# Patient Record
Sex: FEMALE | Race: WHITE | Employment: UNEMPLOYED | URBAN - METROPOLITAN AREA
[De-identification: names, ages, dates, MRNs, and addresses within clinical notes are randomized per-mention and may not be internally consistent; named-entity substitution may affect disease eponyms.]

---

## 2018-11-19 PROBLEM — IMO0002 CHRONIC MIGRAINE: Status: ACTIVE | Noted: 2018-11-19

## 2018-11-19 PROBLEM — F90.0 ATTENTION DEFICIT HYPERACTIVITY DISORDER (ADHD), PREDOMINANTLY INATTENTIVE TYPE: Status: ACTIVE | Noted: 2018-11-19

## 2019-01-03 PROCEDURE — 88175 CYTOPATH C/V AUTO FLUID REDO: CPT | Performed by: OBSTETRICS & GYNECOLOGY

## 2019-01-03 PROCEDURE — 87624 HPV HI-RISK TYP POOLED RSLT: CPT | Performed by: OBSTETRICS & GYNECOLOGY

## 2019-09-12 PROBLEM — M54.2 NECK PAIN: Status: ACTIVE | Noted: 2019-09-12

## 2019-09-12 PROBLEM — M54.9 UPPER BACK PAIN: Status: ACTIVE | Noted: 2019-09-12

## 2020-05-07 PROBLEM — E55.9 VITAMIN D DEFICIENCY: Status: ACTIVE | Noted: 2020-05-07

## 2020-05-07 PROBLEM — K21.9 GASTROESOPHAGEAL REFLUX DISEASE WITHOUT ESOPHAGITIS: Status: ACTIVE | Noted: 2020-05-07

## 2020-12-17 NOTE — ED INITIAL ASSESSMENT (HPI)
44 y/o female to ED with c/o of adverse reaction to taking clindamycin. Patient reports she has been having panic attacks and severe nausea and hot flashes shortly after she takes medication.

## 2020-12-17 NOTE — ED PROVIDER NOTES
Patient Seen in: THE [facility] Emergency Department In [place]      History   Patient presents with:   Anxiety/Panic attack  Nausea/Vomiting/Diarrhea    Stated Complaint: clindamycin abx, having bad reaction to them, nausea and panic attack    HPI    The taryn above.    Physical Exam     ED Triage Vitals [12/17/20 0133]   BP (!) 151/93   Pulse 107   Resp 24   Temp    Temp src    SpO2 100 %   O2 Device None (Room air)       Current:/87   Pulse 100   Resp 18   Ht 165.1 cm (5' 5\")   Wt 59 kg   LMP 11/27/2020

## 2020-12-18 ENCOUNTER — HOSPITAL ENCOUNTER (EMERGENCY)
Age: 40
Discharge: HOME | End: 2020-12-19
Attending: EMERGENCY MEDICINE
Payer: COMMERCIAL

## 2020-12-18 DIAGNOSIS — K59.00 CONSTIPATION, UNSPECIFIED CONSTIPATION TYPE: Primary | ICD-10-CM

## 2020-12-18 PROCEDURE — 99284 EMERGENCY DEPT VISIT MOD MDM: CPT

## 2020-12-18 PROCEDURE — 96374 THER/PROPH/DIAG INJ IV PUSH: CPT

## 2020-12-18 PROCEDURE — 96376 TX/PRO/DX INJ SAME DRUG ADON: CPT

## 2020-12-19 ENCOUNTER — APPOINTMENT (OUTPATIENT)
Dept: GENERAL RADIOLOGY | Age: 40
End: 2020-12-19
Attending: EMERGENCY MEDICINE
Payer: COMMERCIAL

## 2020-12-19 VITALS
HEART RATE: 108 BPM | RESPIRATION RATE: 18 BRPM | SYSTOLIC BLOOD PRESSURE: 126 MMHG | OXYGEN SATURATION: 98 % | TEMPERATURE: 99 F | DIASTOLIC BLOOD PRESSURE: 87 MMHG

## 2020-12-19 PROCEDURE — 74019 RADEX ABDOMEN 2 VIEWS: CPT | Performed by: EMERGENCY MEDICINE

## 2020-12-19 RX ORDER — LORAZEPAM 2 MG/ML
0.5 INJECTION INTRAMUSCULAR ONCE
Status: COMPLETED | OUTPATIENT
Start: 2020-12-19 | End: 2020-12-19

## 2020-12-19 NOTE — ED INITIAL ASSESSMENT (HPI)
44 y/o female to ED with c/o of abdominal pain. Patient had abdomiplasty done last Friday. Patient reports she has been having abdominal pain the last few days shortly after she eats and when she is trying to have a bowel movement.  Last bowel movement was

## 2020-12-19 NOTE — ED PROVIDER NOTES
Patient Seen in: THE [facility] Emergency Department In [place]      History   Patient presents with:  Abdomen/Flank Pain  Anxiety/Panic attack    Stated Complaint: seen her wednesday, anxiety, stomach problems, constipation    HPI    Patient with abdominal p adenopathy  Lungs: Clear  Abdomen: Soft and nontender. No evidence of surgical abdomen. Not distended. Surgical wound healing well. Rectal exam: No external abnormalities. Scant stool in rectum.     ED Course     Labs Reviewed   RAINBOW DRAW BLUE   KELLY

## 2021-03-05 NOTE — ED INITIAL ASSESSMENT (HPI)
PANIC ATTACK ONSET 6 PM TONIGHT. NO RELIEF WITH 2 MG ATIVAN. PT DENIES ANY SUICIDAL OR HOMICIDAL IDEATIONS.

## 2021-03-05 NOTE — ED PROVIDER NOTES
Patient Seen in: THE [facility] Emergency Department In [place]      History   Patient presents with:   Anxiety/Panic attack    Stated Complaint: panic attack, nausea    HPI/Subjective:   HPI  64-year-old woman history of anxiety, here with complaint of severe Cardiovascular:  Normal rate and regular rhythm. No Edema  Pulmonary:  Pulmonary effort is normal.  Normal breath sounds. No wheezing, rhonchi or rales.    Abdominal: Soft nontender nondistended, normal bowel sounds, no guarding no rebound tenderness  Sk (primary encounter diagnosis)  Dehydration  Hypokalemia    Disposition:  There is no disposition on file for this visit. There is no disposition time on file for this visit.     Follow-up:  Duard Snellen, MD  [address]

## 2021-03-13 NOTE — ED PROVIDER NOTES
Patient Seen in: THE [facility] Emergency Department In [place]      History   Patient presents with:  Dizziness  Anxiety/Panic attack    Stated Complaint: dizzy, vertigo, anxious for last three hours    HPI/Subjective:   HPI    Patient with history of anxiet (5' 5\")   Wt 59 kg   LMP 02/26/2021   SpO2 99%   BMI 21.63 kg/m²         Physical Exam  Appears in good general health although very anxious. Alert and cooperative. Skin: Warm and dry without cyanosis or pallor.   HEENT: Pupils equal round reactive to li

## 2021-03-13 NOTE — ED NOTES
PT reports that her dizziness and anxiety have decreased in intensity, but are still present. PT no longer tearful.

## 2021-03-13 NOTE — ED INITIAL ASSESSMENT (HPI)
PT to the ED for evaluation of dizziness, nausea and anxiety. PT reports that her anxiety medication was recently changed from ativan to xanax. Xanax taken last at 1400. No relief. Denies chest pain. Denies shortness of breath.

## 2021-03-20 NOTE — ED INITIAL ASSESSMENT (HPI)
States she os having a panic attack for 2 hrs.  Took one Xanax 1 mg 1 hr ago and is afraid to take her Ativan

## 2021-03-21 NOTE — ED PROVIDER NOTES
Patient Seen in: [facility] Emergency Department In [place]      History   Patient presents with:   Anxiety/Panic attack    Stated Complaint: panic attack    HPI/Subjective:   HPI    67-year-old female presents to the emergency department for an anxiety at female who is hyperventilating sitting upright on a gurney. Vital signs were reviewed per nurse's notes. HEENT: Normocephalic atraumatic. Anicteric sclera. Oral mucosa is moist.  Oropharynx is normal.  Neck: No adenopathy or thyromegaly.   Lungs are dwain

## 2021-04-19 ENCOUNTER — HOSPITAL ENCOUNTER (EMERGENCY)
Age: 41
Discharge: HOME | End: 2021-04-19
Attending: EMERGENCY MEDICINE
Payer: COMMERCIAL

## 2021-04-19 VITALS
WEIGHT: 124 LBS | OXYGEN SATURATION: 98 % | HEART RATE: 74 BPM | TEMPERATURE: 98 F | BODY MASS INDEX: 20.66 KG/M2 | DIASTOLIC BLOOD PRESSURE: 56 MMHG | SYSTOLIC BLOOD PRESSURE: 124 MMHG | RESPIRATION RATE: 16 BRPM | HEIGHT: 65 IN

## 2021-04-19 DIAGNOSIS — E86.0 DEHYDRATION: Primary | ICD-10-CM

## 2021-04-19 PROCEDURE — 96361 HYDRATE IV INFUSION ADD-ON: CPT

## 2021-04-19 PROCEDURE — 99284 EMERGENCY DEPT VISIT MOD MDM: CPT

## 2021-04-19 PROCEDURE — 96374 THER/PROPH/DIAG INJ IV PUSH: CPT

## 2021-04-19 PROCEDURE — 80053 COMPREHEN METABOLIC PANEL: CPT | Performed by: EMERGENCY MEDICINE

## 2021-04-19 PROCEDURE — 96375 TX/PRO/DX INJ NEW DRUG ADDON: CPT

## 2021-04-19 PROCEDURE — 85025 COMPLETE CBC W/AUTO DIFF WBC: CPT | Performed by: EMERGENCY MEDICINE

## 2021-04-19 RX ORDER — LORAZEPAM 0.5 MG/1
0.5 TABLET ORAL 2 TIMES DAILY PRN
Qty: 20 TABLET | Refills: 0 | Status: SHIPPED | OUTPATIENT
Start: 2021-04-19 | End: 2021-04-26

## 2021-04-19 RX ORDER — ONDANSETRON 2 MG/ML
4 INJECTION INTRAMUSCULAR; INTRAVENOUS ONCE
Status: COMPLETED | OUTPATIENT
Start: 2021-04-19 | End: 2021-04-19

## 2021-04-19 RX ORDER — ONDANSETRON 8 MG/1
8 TABLET, ORALLY DISINTEGRATING ORAL EVERY 4 HOURS PRN
Qty: 10 TABLET | Refills: 0 | Status: SHIPPED | OUTPATIENT
Start: 2021-04-19 | End: 2021-04-26

## 2021-04-19 RX ORDER — LORAZEPAM 2 MG/ML
1 INJECTION INTRAMUSCULAR ONCE
Status: COMPLETED | OUTPATIENT
Start: 2021-04-19 | End: 2021-04-19

## 2021-04-20 NOTE — ED PROVIDER NOTES
Patient Seen in: [facility] Emergency Department In [place]      History   Patient presents with:  Nausea/Vomiting/Diarrhea  Fever  Anxiety/Panic attack    Stated Complaint: c diff, abd pain, panic attack, fevers    HPI/Subjective:   HPI    52-year-old wo The following orders were created for panel order CBC WITH DIFFERENTIAL WITH PLATELET.   Procedure                               Abnormality         Status                     ---------                               -----------         ------

## 2021-04-20 NOTE — ED INITIAL ASSESSMENT (HPI)
Terrible ha, nausea, concerned with dehydration, anxious not feeling better. Has been on vancomycin for ten days for c diff. C/o intermittment fevers. \" vancomycin is not agreeing with her\".

## 2021-06-03 PROBLEM — F41.9 ANXIETY: Status: ACTIVE | Noted: 2021-06-03

## 2021-08-19 ENCOUNTER — HOSPITAL ENCOUNTER (EMERGENCY)
Facility: HOSPITAL | Age: 41
Discharge: HOME | End: 2021-08-20
Attending: STUDENT IN AN ORGANIZED HEALTH CARE EDUCATION/TRAINING PROGRAM
Payer: COMMERCIAL

## 2021-08-19 DIAGNOSIS — R10.9 ABDOMINAL CRAMPING: ICD-10-CM

## 2021-08-19 DIAGNOSIS — R19.7 DIARRHEA, UNSPECIFIED TYPE: Primary | ICD-10-CM

## 2021-08-19 PROCEDURE — 96374 THER/PROPH/DIAG INJ IV PUSH: CPT

## 2021-08-19 PROCEDURE — 99284 EMERGENCY DEPT VISIT MOD MDM: CPT

## 2021-08-19 PROCEDURE — 80053 COMPREHEN METABOLIC PANEL: CPT | Performed by: STUDENT IN AN ORGANIZED HEALTH CARE EDUCATION/TRAINING PROGRAM

## 2021-08-19 PROCEDURE — 85025 COMPLETE CBC W/AUTO DIFF WBC: CPT | Performed by: STUDENT IN AN ORGANIZED HEALTH CARE EDUCATION/TRAINING PROGRAM

## 2021-08-19 PROCEDURE — 96361 HYDRATE IV INFUSION ADD-ON: CPT

## 2021-08-19 PROCEDURE — 83690 ASSAY OF LIPASE: CPT | Performed by: STUDENT IN AN ORGANIZED HEALTH CARE EDUCATION/TRAINING PROGRAM

## 2021-08-19 PROCEDURE — 96372 THER/PROPH/DIAG INJ SC/IM: CPT

## 2021-08-19 RX ORDER — KETOROLAC TROMETHAMINE 30 MG/ML
15 INJECTION, SOLUTION INTRAMUSCULAR; INTRAVENOUS ONCE
Status: COMPLETED | OUTPATIENT
Start: 2021-08-19 | End: 2021-08-19

## 2021-08-19 RX ORDER — DICYCLOMINE HYDROCHLORIDE 10 MG/ML
20 INJECTION INTRAMUSCULAR ONCE
Status: COMPLETED | OUTPATIENT
Start: 2021-08-19 | End: 2021-08-19

## 2021-08-20 VITALS
HEART RATE: 68 BPM | OXYGEN SATURATION: 99 % | BODY MASS INDEX: 21 KG/M2 | WEIGHT: 125 LBS | TEMPERATURE: 98 F | RESPIRATION RATE: 18 BRPM | SYSTOLIC BLOOD PRESSURE: 111 MMHG | DIASTOLIC BLOOD PRESSURE: 72 MMHG

## 2021-08-20 LAB
ALBUMIN SERPL-MCNC: 3.6 G/DL (ref 3.4–5)
ALBUMIN/GLOB SERPL: 1.1 {RATIO} (ref 1–2)
ALP LIVER SERPL-CCNC: 76 U/L
ALT SERPL-CCNC: 25 U/L
ANION GAP SERPL CALC-SCNC: 2 MMOL/L (ref 0–18)
AST SERPL-CCNC: 23 U/L (ref 15–37)
BASOPHILS # BLD AUTO: 0.05 X10(3) UL (ref 0–0.2)
BASOPHILS NFR BLD AUTO: 0.6 %
BILIRUB SERPL-MCNC: 0.2 MG/DL (ref 0.1–2)
BUN BLD-MCNC: 8 MG/DL (ref 7–18)
CALCIUM BLD-MCNC: 9.3 MG/DL (ref 8.5–10.1)
CHLORIDE SERPL-SCNC: 111 MMOL/L (ref 98–112)
CO2 SERPL-SCNC: 28 MMOL/L (ref 21–32)
CREAT BLD-MCNC: 0.62 MG/DL
EOSINOPHIL # BLD AUTO: 0.08 X10(3) UL (ref 0–0.7)
EOSINOPHIL NFR BLD AUTO: 0.9 %
ERYTHROCYTE [DISTWIDTH] IN BLOOD BY AUTOMATED COUNT: 12.4 %
GLOBULIN PLAS-MCNC: 3.3 G/DL (ref 2.8–4.4)
GLUCOSE BLD-MCNC: 84 MG/DL (ref 70–99)
HCT VFR BLD AUTO: 36.5 %
HGB BLD-MCNC: 13 G/DL
IMM GRANULOCYTES # BLD AUTO: 0.02 X10(3) UL (ref 0–1)
IMM GRANULOCYTES NFR BLD: 0.2 %
LIPASE SERPL-CCNC: 179 U/L (ref 73–393)
LYMPHOCYTES # BLD AUTO: 2.53 X10(3) UL (ref 1–4)
LYMPHOCYTES NFR BLD AUTO: 28.3 %
M PROTEIN MFR SERPL ELPH: 6.9 G/DL (ref 6.4–8.2)
MCH RBC QN AUTO: 32.2 PG (ref 26–34)
MCHC RBC AUTO-ENTMCNC: 35.6 G/DL (ref 31–37)
MCV RBC AUTO: 90.3 FL
MONOCYTES # BLD AUTO: 0.9 X10(3) UL (ref 0.1–1)
MONOCYTES NFR BLD AUTO: 10.1 %
NEUTROPHILS # BLD AUTO: 5.37 X10 (3) UL (ref 1.5–7.7)
NEUTROPHILS # BLD AUTO: 5.37 X10(3) UL (ref 1.5–7.7)
NEUTROPHILS NFR BLD AUTO: 59.9 %
OSMOLALITY SERPL CALC.SUM OF ELEC: 290 MOSM/KG (ref 275–295)
PLATELET # BLD AUTO: 355 10(3)UL (ref 150–450)
POTASSIUM SERPL-SCNC: 3.6 MMOL/L (ref 3.5–5.1)
RBC # BLD AUTO: 4.04 X10(6)UL
SODIUM SERPL-SCNC: 141 MMOL/L (ref 136–145)
WBC # BLD AUTO: 9 X10(3) UL (ref 4–11)

## 2021-08-20 RX ORDER — DICYCLOMINE HCL 20 MG
20 TABLET ORAL 4 TIMES DAILY PRN
Qty: 30 TABLET | Refills: 0 | Status: SHIPPED | OUTPATIENT
Start: 2021-08-20 | End: 2021-09-09

## 2021-08-20 NOTE — ED PROVIDER NOTES
Patient Seen in: [facility] Emergency Department      History   Patient presents with:  Nausea/Vomiting/Diarrhea    Stated Complaint: diarrhea     HPI/Subjective:   HPI    Patient is a 45-year-old female presenting to the emergency department for ev reviewed and negative except as noted above.     Physical Exam     ED Triage Vitals [08/19/21 7036]   /76   Pulse 72   Resp 16   Temp 98 °F (36.7 °C)   Temp src Temporal   SpO2 98 %   O2 Device None (Room air)       Current:/72   Pulse 68   Temp cholecystitis, pancreatitis, diverticulitis, urinary tract infection, perforated viscus, mesenteric ischemia, peptic ulcer disease, pyelonephritis, nephrolithiasis, and other GI, urologic, gynecologic, infectious, vascular and other pathology.     Patient's

## 2021-08-20 NOTE — ED INITIAL ASSESSMENT (HPI)
NVD, decreased appetite. Pt was seen at [facility] today, c-diff panel, negative. Denies urinary sx.

## 2021-08-24 ENCOUNTER — HOSPITAL ENCOUNTER (EMERGENCY)
Age: 41
Discharge: HOME | End: 2021-08-24
Attending: EMERGENCY MEDICINE
Payer: COMMERCIAL

## 2021-08-24 VITALS
OXYGEN SATURATION: 98 % | WEIGHT: 128 LBS | RESPIRATION RATE: 14 BRPM | BODY MASS INDEX: 21.33 KG/M2 | DIASTOLIC BLOOD PRESSURE: 69 MMHG | TEMPERATURE: 98 F | HEIGHT: 65 IN | SYSTOLIC BLOOD PRESSURE: 101 MMHG | HEART RATE: 68 BPM

## 2021-08-24 DIAGNOSIS — R42 LIGHTHEADEDNESS: Primary | ICD-10-CM

## 2021-08-24 DIAGNOSIS — R11.0 NAUSEA: ICD-10-CM

## 2021-08-24 LAB
ALBUMIN SERPL-MCNC: 3.7 G/DL (ref 3.4–5)
ALBUMIN/GLOB SERPL: 1.2 {RATIO} (ref 1–2)
ALP LIVER SERPL-CCNC: 71 U/L
ALT SERPL-CCNC: 23 U/L
ANION GAP SERPL CALC-SCNC: 2 MMOL/L (ref 0–18)
AST SERPL-CCNC: 28 U/L (ref 15–37)
BASOPHILS # BLD AUTO: 0.06 X10(3) UL (ref 0–0.2)
BASOPHILS NFR BLD AUTO: 0.5 %
BILIRUB SERPL-MCNC: 0.2 MG/DL (ref 0.1–2)
BILIRUB UR QL STRIP.AUTO: NEGATIVE
BUN BLD-MCNC: 9 MG/DL (ref 7–18)
CALCIUM BLD-MCNC: 8.9 MG/DL (ref 8.5–10.1)
CHLORIDE SERPL-SCNC: 108 MMOL/L (ref 98–112)
CLARITY UR REFRACT.AUTO: CLEAR
CO2 SERPL-SCNC: 27 MMOL/L (ref 21–32)
COLOR UR AUTO: YELLOW
CREAT BLD-MCNC: 0.74 MG/DL
EOSINOPHIL # BLD AUTO: 0.09 X10(3) UL (ref 0–0.7)
EOSINOPHIL NFR BLD AUTO: 0.8 %
ERYTHROCYTE [DISTWIDTH] IN BLOOD BY AUTOMATED COUNT: 12.3 %
GLOBULIN PLAS-MCNC: 3.2 G/DL (ref 2.8–4.4)
GLUCOSE BLD-MCNC: 77 MG/DL (ref 70–99)
GLUCOSE UR STRIP.AUTO-MCNC: NEGATIVE MG/DL
HCT VFR BLD AUTO: 37.6 %
HGB BLD-MCNC: 13.1 G/DL
IMM GRANULOCYTES # BLD AUTO: 0.05 X10(3) UL (ref 0–1)
IMM GRANULOCYTES NFR BLD: 0.4 %
KETONES UR STRIP.AUTO-MCNC: NEGATIVE MG/DL
LEUKOCYTE ESTERASE UR QL STRIP.AUTO: NEGATIVE
LIPASE SERPL-CCNC: 194 U/L (ref 73–393)
LYMPHOCYTES # BLD AUTO: 2.03 X10(3) UL (ref 1–4)
LYMPHOCYTES NFR BLD AUTO: 17.2 %
M PROTEIN MFR SERPL ELPH: 6.9 G/DL (ref 6.4–8.2)
MCH RBC QN AUTO: 32.3 PG (ref 26–34)
MCHC RBC AUTO-ENTMCNC: 34.8 G/DL (ref 31–37)
MCV RBC AUTO: 92.6 FL
MONOCYTES # BLD AUTO: 0.92 X10(3) UL (ref 0.1–1)
MONOCYTES NFR BLD AUTO: 7.8 %
NEUTROPHILS # BLD AUTO: 8.65 X10 (3) UL (ref 1.5–7.7)
NEUTROPHILS # BLD AUTO: 8.65 X10(3) UL (ref 1.5–7.7)
NEUTROPHILS NFR BLD AUTO: 73.3 %
NITRITE UR QL STRIP.AUTO: NEGATIVE
OSMOLALITY SERPL CALC.SUM OF ELEC: 281 MOSM/KG (ref 275–295)
PH UR STRIP.AUTO: 7 [PH] (ref 5–8)
PLATELET # BLD AUTO: 353 10(3)UL (ref 150–450)
POTASSIUM SERPL-SCNC: 4.3 MMOL/L (ref 3.5–5.1)
PROT UR STRIP.AUTO-MCNC: NEGATIVE MG/DL
RBC # BLD AUTO: 4.06 X10(6)UL
RBC UR QL AUTO: NEGATIVE
SODIUM SERPL-SCNC: 137 MMOL/L (ref 136–145)
SP GR UR STRIP.AUTO: 1.02 (ref 1–1.03)
UROBILINOGEN UR STRIP.AUTO-MCNC: 1 MG/DL
WBC # BLD AUTO: 11.8 X10(3) UL (ref 4–11)

## 2021-08-24 PROCEDURE — 99284 EMERGENCY DEPT VISIT MOD MDM: CPT

## 2021-08-24 PROCEDURE — 96374 THER/PROPH/DIAG INJ IV PUSH: CPT

## 2021-08-24 PROCEDURE — 96361 HYDRATE IV INFUSION ADD-ON: CPT

## 2021-08-24 PROCEDURE — 81003 URINALYSIS AUTO W/O SCOPE: CPT | Performed by: EMERGENCY MEDICINE

## 2021-08-24 PROCEDURE — 80053 COMPREHEN METABOLIC PANEL: CPT | Performed by: EMERGENCY MEDICINE

## 2021-08-24 PROCEDURE — 83690 ASSAY OF LIPASE: CPT | Performed by: EMERGENCY MEDICINE

## 2021-08-24 PROCEDURE — 85025 COMPLETE CBC W/AUTO DIFF WBC: CPT | Performed by: EMERGENCY MEDICINE

## 2021-08-24 PROCEDURE — 96375 TX/PRO/DX INJ NEW DRUG ADDON: CPT

## 2021-08-24 PROCEDURE — 80053 COMPREHEN METABOLIC PANEL: CPT

## 2021-08-24 PROCEDURE — 85025 COMPLETE CBC W/AUTO DIFF WBC: CPT

## 2021-08-24 RX ORDER — ONDANSETRON 2 MG/ML
4 INJECTION INTRAMUSCULAR; INTRAVENOUS ONCE
Status: COMPLETED | OUTPATIENT
Start: 2021-08-24 | End: 2021-08-24

## 2021-08-24 RX ORDER — LORAZEPAM 2 MG/ML
1 INJECTION INTRAMUSCULAR ONCE
Status: COMPLETED | OUTPATIENT
Start: 2021-08-24 | End: 2021-08-24

## 2021-08-25 NOTE — ED PROVIDER NOTES
Patient Seen in: [facility] Emergency Department In [facility]      History   Patient presents with:  Dizziness  Nausea/Vomiting/Diarrhea    Stated Complaint: dizziness and nausea 2000.      HPI/Subjective:   HPI    Patient is a 81-year-old female with a hist Temp 97.8 °F (36.6 °C)   Temp src Temporal   SpO2 98 %   O2 Device None (Room air)       Current:/69   Pulse 68   Temp 97.8 °F (36.6 °C) (Temporal)   Resp 14   Ht 165.1 cm (5' 5\")   Wt 58.1 kg   LMP 08/12/2021   SpO2 98%   BMI 21.30 kg/m² W/ DIFFERENTIAL[[id number]]          Abnormal            Final result                 Please view results for these tests on the individual orders.                    MDM      46-year-old female who reports she has had recurrent GI issues since having C. dif

## 2021-10-06 PROBLEM — K13.70 LESION OF UVULA: Status: ACTIVE | Noted: 2021-10-06

## 2021-10-06 PROBLEM — R13.12 OROPHARYNGEAL DYSPHAGIA: Status: ACTIVE | Noted: 2021-10-06

## 2021-11-10 ENCOUNTER — HOSPITAL ENCOUNTER (EMERGENCY)
Age: 41
Discharge: HOME | End: 2021-11-11
Attending: EMERGENCY MEDICINE
Payer: COMMERCIAL

## 2021-11-10 ENCOUNTER — APPOINTMENT (OUTPATIENT)
Dept: CT IMAGING | Age: 41
End: 2021-11-10
Attending: EMERGENCY MEDICINE
Payer: COMMERCIAL

## 2021-11-10 DIAGNOSIS — R19.7 DIARRHEA, UNSPECIFIED TYPE: Primary | ICD-10-CM

## 2021-11-10 DIAGNOSIS — R10.9 ABDOMINAL PAIN, ACUTE: ICD-10-CM

## 2021-11-10 PROCEDURE — 99284 EMERGENCY DEPT VISIT MOD MDM: CPT

## 2021-11-10 PROCEDURE — 83690 ASSAY OF LIPASE: CPT | Performed by: EMERGENCY MEDICINE

## 2021-11-10 PROCEDURE — 81003 URINALYSIS AUTO W/O SCOPE: CPT | Performed by: EMERGENCY MEDICINE

## 2021-11-10 PROCEDURE — 96361 HYDRATE IV INFUSION ADD-ON: CPT

## 2021-11-10 PROCEDURE — 74177 CT ABD & PELVIS W/CONTRAST: CPT | Performed by: EMERGENCY MEDICINE

## 2021-11-10 PROCEDURE — 80053 COMPREHEN METABOLIC PANEL: CPT | Performed by: EMERGENCY MEDICINE

## 2021-11-10 PROCEDURE — 85025 COMPLETE CBC W/AUTO DIFF WBC: CPT | Performed by: EMERGENCY MEDICINE

## 2021-11-10 PROCEDURE — 96374 THER/PROPH/DIAG INJ IV PUSH: CPT

## 2021-11-10 PROCEDURE — 93005 ELECTROCARDIOGRAM TRACING: CPT

## 2021-11-10 PROCEDURE — 93010 ELECTROCARDIOGRAM REPORT: CPT

## 2021-11-10 RX ORDER — ONDANSETRON 2 MG/ML
4 INJECTION INTRAMUSCULAR; INTRAVENOUS
Status: DISCONTINUED | OUTPATIENT
Start: 2021-11-10 | End: 2021-11-11

## 2021-11-11 ENCOUNTER — LAB ENCOUNTER (OUTPATIENT)
Dept: LAB | Age: 41
End: 2021-11-11
Attending: EMERGENCY MEDICINE
Payer: COMMERCIAL

## 2021-11-11 VITALS
HEIGHT: 65 IN | DIASTOLIC BLOOD PRESSURE: 88 MMHG | OXYGEN SATURATION: 99 % | WEIGHT: 130 LBS | HEART RATE: 88 BPM | RESPIRATION RATE: 16 BRPM | SYSTOLIC BLOOD PRESSURE: 138 MMHG | BODY MASS INDEX: 21.66 KG/M2 | TEMPERATURE: 98 F

## 2021-11-11 DIAGNOSIS — R19.7 DIARRHEA, UNSPECIFIED TYPE: ICD-10-CM

## 2021-11-11 PROCEDURE — 82272 OCCULT BLD FECES 1-3 TESTS: CPT

## 2021-11-11 PROCEDURE — 87427 SHIGA-LIKE TOXIN AG IA: CPT

## 2021-11-11 PROCEDURE — 87046 STOOL CULTR AEROBIC BACT EA: CPT

## 2021-11-11 PROCEDURE — 87493 C DIFF AMPLIFIED PROBE: CPT

## 2021-11-11 PROCEDURE — 87045 FECES CULTURE AEROBIC BACT: CPT

## 2021-11-11 RX ORDER — DICYCLOMINE HCL 20 MG
20 TABLET ORAL 4 TIMES DAILY PRN
Qty: 30 TABLET | Refills: 0 | Status: SHIPPED | OUTPATIENT
Start: 2021-11-10 | End: 2021-12-10

## 2021-11-11 RX ORDER — DICYCLOMINE HCL 20 MG
20 TABLET ORAL ONCE
Status: COMPLETED | OUTPATIENT
Start: 2021-11-11 | End: 2021-11-11

## 2021-11-11 NOTE — ED INITIAL ASSESSMENT (HPI)
Pt has nvd for 5 days, no fever. Pt states she is feeling weak and [facility]

## 2021-11-11 NOTE — ED PROVIDER NOTES
Patient Seen in: THE [facility] Emergency Department In [place]      History   Patient presents with:  Nausea/Vomiting/Diarrhea    Stated Complaint: nvd abd pain    Subjective:   HPI    Patient complains of symptoms ongoing now for about 4 to 5 days.   Patient Soft, nondistended. Tender in the right mid abdomen into the right lower quadrant and across the suprapubic abdomen and somewhat in the left lower quadrant as well. Definitely no guarding, rigidity, rebound  Extremities: Unremarkable.   Calves nonswollen, and treated with nausea medicine    CBC shows elevated white count with a predominance of neutrophils. Hemoglobin and platelets are normal  Metabolic panel shows normal glucose.   Creatinine normal.  ALT normal.  Lipase normal  Urinalysis shows negative bl

## 2021-11-11 NOTE — ED PROVIDER NOTES
EKG    Rate, intervals and axes as noted on EKG Report.   Rate: 72  Rhythm: Sinus  Reading: No areas of acute ST segment elevation or depression

## 2023-01-12 ENCOUNTER — HOSPITAL ENCOUNTER (EMERGENCY)
Age: 43
Discharge: HOME | End: 2023-01-12
Attending: EMERGENCY MEDICINE
Payer: COMMERCIAL

## 2023-01-12 ENCOUNTER — APPOINTMENT (OUTPATIENT)
Dept: CT IMAGING | Age: 43
End: 2023-01-12
Attending: EMERGENCY MEDICINE
Payer: COMMERCIAL

## 2023-01-12 VITALS
DIASTOLIC BLOOD PRESSURE: 80 MMHG | HEART RATE: 76 BPM | TEMPERATURE: 98 F | OXYGEN SATURATION: 100 % | RESPIRATION RATE: 16 BRPM | BODY MASS INDEX: 21.66 KG/M2 | HEIGHT: 65 IN | SYSTOLIC BLOOD PRESSURE: 105 MMHG | WEIGHT: 130 LBS

## 2023-01-12 DIAGNOSIS — N83.209 RUPTURED OVARIAN CYST: Primary | ICD-10-CM

## 2023-01-12 LAB
ALBUMIN SERPL-MCNC: 3.3 G/DL (ref 3.4–5)
ALBUMIN/GLOB SERPL: 1 {RATIO} (ref 1–2)
ALP LIVER SERPL-CCNC: 64 U/L
ALT SERPL-CCNC: 20 U/L
ANION GAP SERPL CALC-SCNC: 4 MMOL/L (ref 0–18)
AST SERPL-CCNC: 17 U/L (ref 15–37)
B-HCG UR QL: NEGATIVE
BASOPHILS # BLD AUTO: 0.06 X10(3) UL (ref 0–0.2)
BASOPHILS NFR BLD AUTO: 0.5 %
BILIRUB SERPL-MCNC: 0.2 MG/DL (ref 0.1–2)
BILIRUB UR QL STRIP.AUTO: NEGATIVE
BUN BLD-MCNC: 11 MG/DL (ref 7–18)
CALCIUM BLD-MCNC: 8.5 MG/DL (ref 8.5–10.1)
CHLORIDE SERPL-SCNC: 111 MMOL/L (ref 98–112)
CLARITY UR REFRACT.AUTO: CLEAR
CO2 SERPL-SCNC: 24 MMOL/L (ref 21–32)
COLOR UR AUTO: YELLOW
CREAT BLD-MCNC: 0.65 MG/DL
EOSINOPHIL # BLD AUTO: 0.11 X10(3) UL (ref 0–0.7)
EOSINOPHIL NFR BLD AUTO: 0.9 %
ERYTHROCYTE [DISTWIDTH] IN BLOOD BY AUTOMATED COUNT: 12.1 %
GFR SERPLBLD BASED ON 1.73 SQ M-ARVRAT: 113 ML/MIN/1.73M2 (ref 60–?)
GLOBULIN PLAS-MCNC: 3.3 G/DL (ref 2.8–4.4)
GLUCOSE BLD-MCNC: 92 MG/DL (ref 70–99)
GLUCOSE UR STRIP.AUTO-MCNC: NEGATIVE MG/DL
HCT VFR BLD AUTO: 37.8 %
HGB BLD-MCNC: 13.2 G/DL
IMM GRANULOCYTES # BLD AUTO: 0.05 X10(3) UL (ref 0–1)
IMM GRANULOCYTES NFR BLD: 0.4 %
KETONES UR STRIP.AUTO-MCNC: NEGATIVE MG/DL
LEUKOCYTE ESTERASE UR QL STRIP.AUTO: NEGATIVE
LIPASE SERPL-CCNC: 239 U/L (ref 73–393)
LYMPHOCYTES # BLD AUTO: 2.45 X10(3) UL (ref 1–4)
LYMPHOCYTES NFR BLD AUTO: 20.1 %
MCH RBC QN AUTO: 30.8 PG (ref 26–34)
MCHC RBC AUTO-ENTMCNC: 34.9 G/DL (ref 31–37)
MCV RBC AUTO: 88.3 FL
MONOCYTES # BLD AUTO: 0.71 X10(3) UL (ref 0.1–1)
MONOCYTES NFR BLD AUTO: 5.8 %
NEUTROPHILS # BLD AUTO: 8.79 X10 (3) UL (ref 1.5–7.7)
NEUTROPHILS # BLD AUTO: 8.79 X10(3) UL (ref 1.5–7.7)
NEUTROPHILS NFR BLD AUTO: 72.3 %
NITRITE UR QL STRIP.AUTO: NEGATIVE
OSMOLALITY SERPL CALC.SUM OF ELEC: 287 MOSM/KG (ref 275–295)
PH UR STRIP.AUTO: 6.5 [PH] (ref 5–8)
PLATELET # BLD AUTO: 345 10(3)UL (ref 150–450)
POTASSIUM SERPL-SCNC: 4.1 MMOL/L (ref 3.5–5.1)
PROT SERPL-MCNC: 6.6 G/DL (ref 6.4–8.2)
PROT UR STRIP.AUTO-MCNC: NEGATIVE MG/DL
RBC # BLD AUTO: 4.28 X10(6)UL
RBC UR QL AUTO: NEGATIVE
SARS-COV-2 RNA RESP QL NAA+PROBE: NOT DETECTED
SODIUM SERPL-SCNC: 139 MMOL/L (ref 136–145)
SP GR UR STRIP.AUTO: 1.01 (ref 1–1.03)
UROBILINOGEN UR STRIP.AUTO-MCNC: 0.2 MG/DL
WBC # BLD AUTO: 12.2 X10(3) UL (ref 4–11)

## 2023-01-12 PROCEDURE — 96361 HYDRATE IV INFUSION ADD-ON: CPT

## 2023-01-12 PROCEDURE — 99284 EMERGENCY DEPT VISIT MOD MDM: CPT

## 2023-01-12 PROCEDURE — 81003 URINALYSIS AUTO W/O SCOPE: CPT | Performed by: EMERGENCY MEDICINE

## 2023-01-12 PROCEDURE — 83690 ASSAY OF LIPASE: CPT | Performed by: EMERGENCY MEDICINE

## 2023-01-12 PROCEDURE — 85025 COMPLETE CBC W/AUTO DIFF WBC: CPT | Performed by: EMERGENCY MEDICINE

## 2023-01-12 PROCEDURE — 96374 THER/PROPH/DIAG INJ IV PUSH: CPT

## 2023-01-12 PROCEDURE — 74177 CT ABD & PELVIS W/CONTRAST: CPT | Performed by: EMERGENCY MEDICINE

## 2023-01-12 PROCEDURE — 80053 COMPREHEN METABOLIC PANEL: CPT | Performed by: EMERGENCY MEDICINE

## 2023-01-12 PROCEDURE — 81025 URINE PREGNANCY TEST: CPT

## 2023-01-12 RX ORDER — KETOROLAC TROMETHAMINE 15 MG/ML
15 INJECTION, SOLUTION INTRAMUSCULAR; INTRAVENOUS ONCE
Status: COMPLETED | OUTPATIENT
Start: 2023-01-12 | End: 2023-01-12

## 2023-01-12 RX ORDER — DIAZEPAM 10 MG/1
12 TABLET ORAL EVERY EVENING
COMMUNITY

## 2023-01-12 RX ORDER — DIAZEPAM 10 MG/1
7 TABLET ORAL EVERY MORNING
COMMUNITY

## 2023-01-12 NOTE — ED INITIAL ASSESSMENT (HPI)
llq abd pain for past 2 days. Went to [facility] and got toradol, zofran and ivf.   Nausea without vomiting

## 2023-01-13 NOTE — DISCHARGE INSTRUCTIONS
Ibuprofen 400 mg 3 times a day with food, alternate with 1 g of Tylenol twice daily for 3 to 5 days  Pelvic rest: No sex, tampons, douching or heavy lifting  Warm compresses topically to abdominal wall 20 minutes every few hours as needed  Return if worse  Follow-up with your OB, call on Monday for outpatient appointment.   Most likely will need repeat ultrasound in 1 menstrual cycle

## 2024-04-24 ENCOUNTER — OFFICE VISIT (OUTPATIENT)
Facility: LOCATION | Age: 44
End: 2024-04-24
Payer: COMMERCIAL

## 2024-04-24 VITALS — HEART RATE: 86 BPM | TEMPERATURE: 97 F

## 2024-04-24 DIAGNOSIS — K60.2 ANAL FISSURE: Primary | ICD-10-CM

## 2024-04-24 DIAGNOSIS — K64.8 INTERNAL HEMORRHOIDS WITH COMPLICATION: ICD-10-CM

## 2024-04-24 PROCEDURE — 99244 OFF/OP CNSLTJ NEW/EST MOD 40: CPT | Performed by: SURGERY

## 2024-04-24 PROCEDURE — 46600 DIAGNOSTIC ANOSCOPY SPX: CPT | Performed by: SURGERY

## 2024-04-24 NOTE — H&P
New Patient Visit Note       Active Problems      1. Anal fissure    2. Internal hemorrhoids with complication        Chief Complaint   Chief Complaint   Patient presents with    New Patient     NP-Hemorrhoids, Pt. States internal hemorrhoids, Pt. States pain, Pt. States rectal bleeding only with bowel movements.        History of Present Illness     The patient presents to request of her primary care physician for evaluation of foot pain and rectal bleeding.  The patient recent CT seeing Dr. Tyron Rios who diagnosed the patient with an anterior fissure.  The patient is not compliant with her nifedipine treatment.    Patient complains of occasional pain and discomfort like shards of glass as she passed a bowel movement.  Her bowel function is irregular despite dietary changes.  The patient also sees streaks of blood on the toilet tissue.  She reports occasional bright red blood per rectum as well.    The patient denies fever, chills, chest pain, shortness of breath, dyspnea. The patient also denies hematemesis, melena, or hematochezia. The patient denies change in bowel or bladder habits. There is no complaint of hematuria or dysuria.    Allergies  Hadelmery is allergic to penicillins.    Past Medical / Surgical / Social / Family History    The past medical and past surgical history have been reviewed by me today.    Past Medical History:    Acid reflux disease    ADHD    Anxiety    Bartholin cyst    Esophageal reflux    Migraine    Migraines     Past Surgical History:   Procedure Laterality Date    Abdominoplasty            Other surgical history      Rhinoplasty    Upper gi endoscopy,diagnosis      essentially normal per pt, reflux       The family history and social history have been reviewed by me today.    Family History   Problem Relation Age of Onset    Anemia Mother     Diabetes Paternal Grandfather      Social History     Socioeconomic History    Marital status:    Tobacco Use    Smoking  status: Former     Current packs/day: 0.00     Average packs/day: 1 pack/day for 17.0 years (17.0 ttl pk-yrs)     Types: Cigarettes     Start date: 2001     Quit date: 2018     Years since quittin.0    Smokeless tobacco: Never    Tobacco comments:     vaping   Vaping Use    Vaping status: Every Day   Substance and Sexual Activity    Alcohol use: No    Drug use: No    Sexual activity: Yes     Partners: Male     Birth control/protection: Vasectomy        Current Outpatient Medications:     diazePAM 10 MG Oral Tab, Take 12 mg by mouth every evening., Disp: , Rfl:     diazePAM 10 MG Oral Tab, Take 7 mg by mouth every morning., Disp: , Rfl:     ubrogepant (UBRELVY) 100 MG Oral Tab, TAKE 1 TABLET BY MOUTH AT THE ONSET OF THE MIGRAINE AND CAN REPEAT IN 2 HOURS IF NEEDED, Disp: 30 tablet, Rfl: 0    AJOVY 225 MG/1.5ML Subcutaneous Solution Prefilled Syringe, INJECT 225 MG INTO THE SKIN EVERY 30 (THIRTY) DAYS., Disp: 1.5 mL, Rfl: 11    OnabotulinumtoxinA (BOTOX) 200 units Injection Recon Soln, Inject 155 units to face and neck every 12 weeks, Disp: 200 Units, Rfl: 3      Review of Systems  The Review of Systems has been reviewed by me during today.  Review of Systems   Constitutional: Negative.    HENT: Negative.     Eyes: Negative.    Respiratory: Negative.     Cardiovascular: Negative.    Gastrointestinal: Negative.    Genitourinary: Negative.    Musculoskeletal: Negative.    Skin: Negative.    Neurological: Negative.    Psychiatric/Behavioral: Negative.         Physical Findings   Pulse 86   Temp 97.3 °F (36.3 °C) (Temporal)   Physical Exam  Vitals and nursing note reviewed. Exam conducted with a chaperone present.   Constitutional:       General: She is not in acute distress.     Appearance: She is well-developed.   HENT:      Head: Normocephalic and atraumatic.   Eyes:      General: No scleral icterus.  Neck:      Trachea: No tracheal deviation.   Cardiovascular:      Rate and Rhythm: Normal rate and regular  rhythm.      Heart sounds: S1 normal and S2 normal. No murmur heard.  Pulmonary:      Effort: No tachypnea, accessory muscle usage or respiratory distress.      Breath sounds: No decreased breath sounds, wheezing, rhonchi or rales.   Genitourinary:     Exam position: Prone.      Rectum: Anal fissure and internal hemorrhoid present. No mass, tenderness or external hemorrhoid. Normal anal tone.      Comments: No Rectocele  No Rectovaginal Fistula  Anal Sphincter Intact  No Pruritis Ani  No Lichenification  No Abscess  No Fistula in ano  (+) Anterior Fissure  No Posterior Fissure    See Procedures:  Anoscopy confirms multiple prominent internal hemorrhoids without active bleeding now.  No other lesions in the anal rectal canal within view of the anoscope.  Skin:     General: Skin is warm and dry.   Neurological:      Mental Status: She is alert and oriented to person, place, and time.   Psychiatric:         Speech: Speech normal.         Behavior: Behavior normal. Behavior is cooperative.         Thought Content: Thought content normal.         Judgment: Judgment normal.             Assessment   1. Anal fissure    2. Internal hemorrhoids with complication          Plan     The patient has 2 concurrent problems.  She has an anterior anal fissure with sentinel pile and internal hemorrhoids with bleeding.    I recommend addressing these fissure first.  I encouraged the patient to resume taking nifedipine and to take it as prescribed for 2 weeks continuously.    The patient will follow-up with me in 2 weeks to assess response to treatment.  If there has been no interval improvement in her anal fissure we will consider subcutaneous lateral internal sphincterotomy.  Additionally, the patient wishes to consider Botox injection.  If she considers Botox injection I will have her see Dr. Breanne honeycutt for second opinion.    If patient fails to improve with nonoperative management, surgical intervention for her  sphincterotomy    If the patient's fissure heals then we will proceed with rubber band ligation phenol injection in the office.    The patient was provided ample opportunity to ask questions.  All of the patient's questions were answered in detail.  The patient voiced understanding of the care plan.     No orders of the defined types were placed in this encounter.      Imaging & Referrals   None    Follow Up  No follow-ups on file.    Shan Dodge MD

## 2024-09-25 ENCOUNTER — OFFICE VISIT (OUTPATIENT)
Dept: SURGERY | Facility: CLINIC | Age: 44
End: 2024-09-25
Payer: COMMERCIAL

## 2024-09-25 VITALS — TEMPERATURE: 98 F

## 2024-09-25 DIAGNOSIS — K64.8 INTERNAL HEMORRHOIDS: ICD-10-CM

## 2024-09-25 DIAGNOSIS — K60.2 ANAL FISSURE: Primary | ICD-10-CM

## 2024-09-25 PROCEDURE — 99244 OFF/OP CNSLTJ NEW/EST MOD 40: CPT | Performed by: SURGERY

## 2024-09-25 NOTE — PROGRESS NOTES
Follow Up Visit Note       Active Problems      1. Anal fissure    2. Internal hemorrhoids          Chief Complaint   Chief Complaint   Patient presents with    [facility]     EP- Hemorrhoids and Anal Fissure f/u- states no changes since last visit in April, reports rectal pain and bleeding          History of Present Illness    The patient returns for continued evaluation management of her anal fissure.  I last saw the patient in April and recommended use of nifedipine.  Patient used it for a few weeks but stated she experienced no change in symptoms whatsoever.  The patient now wishes to proceed with sphincterotomy.    The patient denies fever, chills, chest pain, shortness of breath, dyspnea. The patient also denies hematemesis, melena, or hematochezia. The patient denies change in bowel or bladder habits. There is no complaint of hematuria or dysuria.    I discussed the risk, benefits, alternatives of surgery.  I discussed the anticipated postoperative recovery including dietary, activity, exercise, and lifestyle recommendations.  The patient's questions regarding surgical procedure were answered and the patient voiced understanding of the care plan.    Allergies  Fortunato is allergic to penicillins.    Past Medical / Surgical / Social / Family History    The past medical and past surgical history have been reviewed by me today.    Past Medical History:    Acid reflux disease    ADHD    Anxiety    Bartholin cyst    Esophageal reflux    Migraine    Migraines     Past Surgical History:   Procedure Laterality Date    Abdominoplasty            Other surgical history      Rhinoplasty    Upper gi endoscopy,diagnosis      essentially normal per pt, reflux       The family history and social history have been reviewed by me today.    Family History   Problem Relation Age of Onset    Anemia Mother     Diabetes Paternal Grandfather      Social History     Socioeconomic History    Marital status:     Tobacco Use    Smoking status: Former     Current packs/day: 0.00     Average packs/day: 1 pack/day for 17.0 years (17.0 ttl pk-yrs)     Types: Cigarettes     Start date: 2001     Quit date: 2018     Years since quittin.4    Smokeless tobacco: Never    Tobacco comments:     vaping   Vaping Use    Vaping status: Every Day   Substance and Sexual Activity    Alcohol use: No    Drug use: No    Sexual activity: Yes     Partners: Male     Birth control/protection: Vasectomy        Current Outpatient Medications:     diazePAM 10 MG Oral Tab, Take 12 mg by mouth every evening., Disp: , Rfl:     diazePAM 10 MG Oral Tab, Take 7 mg by mouth every morning., Disp: , Rfl:     ubrogepant (UBRELVY) 100 MG Oral Tab, TAKE 1 TABLET BY MOUTH AT THE ONSET OF THE MIGRAINE AND CAN REPEAT IN 2 HOURS IF NEEDED, Disp: 30 tablet, Rfl: 0    AJOVY 225 MG/1.5ML Subcutaneous Solution Prefilled Syringe, INJECT 225 MG INTO THE SKIN EVERY 30 (THIRTY) DAYS., Disp: 1.5 mL, Rfl: 11    OnabotulinumtoxinA (BOTOX) 200 units Injection Recon Soln, Inject 155 units to face and neck every 12 weeks, Disp: 200 Units, Rfl: 3     Review of Systems  The Review of Systems has been reviewed by me during today.  Review of Systems   Constitutional:  Negative for chills, diaphoresis, fatigue, fever and unexpected weight change.   HENT:  Negative for hearing loss, nosebleeds, sore throat and trouble swallowing.    Respiratory:  Negative for apnea, cough, shortness of breath and wheezing.    Cardiovascular:  Negative for chest pain, palpitations and leg swelling.   Gastrointestinal:  Negative for abdominal distention, abdominal pain, anal bleeding, blood in stool, constipation, diarrhea, nausea and vomiting.   Genitourinary:  Negative for difficulty urinating, dysuria, frequency and urgency.   Musculoskeletal:  Negative for arthralgias and myalgias.   Skin:  Negative for color change and rash.   Neurological:  Negative for tremors, syncope and weakness.    Hematological:  Negative for adenopathy. Does not bruise/bleed easily.   Psychiatric/Behavioral:  Negative for behavioral problems and sleep disturbance.         Physical Findings   Temp 98 °F (36.7 °C) (Temporal)   Physical Exam  Vitals and nursing note reviewed. Exam conducted with a chaperone present.   Constitutional:       General: She is not in acute distress.     Appearance: She is well-developed.   HENT:      Head: Normocephalic and atraumatic.   Eyes:      General: No scleral icterus.  Neck:      Trachea: No tracheal deviation.   Cardiovascular:      Rate and Rhythm: Normal rate and regular rhythm.      Heart sounds: S1 normal and S2 normal. No murmur heard.  Pulmonary:      Effort: No tachypnea, accessory muscle usage or respiratory distress.      Breath sounds: No decreased breath sounds, wheezing, rhonchi or rales.   Genitourinary:     Exam position: Prone.      Rectum: No mass, tenderness, anal fissure, external hemorrhoid or internal hemorrhoid. Normal anal tone.          Comments: No Rectocele  No Rectovaginal Fistula  Anal Sphincter Intact  No Pruritis Ani  No Lichenification  No Abscess  No Fistula in ano  (+) Anterior Fissure  No Posterior Fissure    See Procedures:  Anoscopy deferred in anticipation of exam under anesthesia and due to patient discomfort  Skin:     General: Skin is warm and dry.   Neurological:      Mental Status: She is alert and oriented to person, place, and time.   Psychiatric:         Speech: Speech normal.         Behavior: Behavior normal. Behavior is cooperative.         Thought Content: Thought content normal.         Judgment: Judgment normal.          Assessment   1. Anal fissure    2. Internal hemorrhoids        Plan     The patient will be scheduled for anorectal examination under anesthesia, lateral subcutaneous internal sphincterotomy, and rubber band ligation of internal hemorrhoids.    The gregorio-operative care plan was discussed with the patient, who voices  understanding.  Activity and lifting recommendations were discussed in length.     The risks, benefits, and alternatives to the procedure were explained to the patient.  The risks explained include, but are not limited to, bleeding, infection, pain wound complications, recurrence, incorrect diagnosis, injury to adjacent organs and structures. We also discussed the possibile need for further therapeutic, diagnostic, or surgical intervention.  The patient voiced understanding, and after all questions were answered to the patient's satisfaction, the patient provided willing and informed consent to proceed.     No orders of the defined types were placed in this encounter.      Imaging & Referrals   None    Follow Up  No follow-ups on file.    Shan Dodge MD

## 2024-10-22 RX ORDER — HYDROXYZINE HYDROCHLORIDE 25 MG/1
25 TABLET, FILM COATED ORAL 3 TIMES DAILY PRN
COMMUNITY

## 2024-10-22 RX ORDER — PROGESTERONE 100 MG/1
100 CAPSULE ORAL NIGHTLY
COMMUNITY

## 2024-11-01 ENCOUNTER — TELEPHONE (OUTPATIENT)
Facility: LOCATION | Age: 44
End: 2024-11-01

## 2024-11-01 NOTE — TELEPHONE ENCOUNTER
Prior authorization required for cpt code 75969, 34398, [id number]. Ref #: [id number]. Spoke with Jt RODRIGUEZ @ 10:00am on 11/1    Start date: 11/15/24  End date: 3/15/25

## 2024-11-15 ENCOUNTER — HOSPITAL ENCOUNTER (OUTPATIENT)
Facility: HOSPITAL | Age: 44
Setting detail: HOSPITAL OUTPATIENT SURGERY
Discharge: HOME | End: 2024-11-15
Attending: SURGERY | Admitting: SURGERY
Payer: COMMERCIAL

## 2024-11-15 ENCOUNTER — ANESTHESIA EVENT (OUTPATIENT)
Dept: SURGERY | Facility: HOSPITAL | Age: 44
End: 2024-11-15
Payer: COMMERCIAL

## 2024-11-15 ENCOUNTER — ANESTHESIA (OUTPATIENT)
Dept: SURGERY | Facility: HOSPITAL | Age: 44
End: 2024-11-15
Payer: COMMERCIAL

## 2024-11-15 VITALS
HEIGHT: 65 IN | SYSTOLIC BLOOD PRESSURE: 103 MMHG | RESPIRATION RATE: 18 BRPM | HEART RATE: 83 BPM | WEIGHT: 154 LBS | TEMPERATURE: 98 F | BODY MASS INDEX: 25.66 KG/M2 | DIASTOLIC BLOOD PRESSURE: 72 MMHG | OXYGEN SATURATION: 93 %

## 2024-11-15 DIAGNOSIS — K60.2 ANAL FISSURE: Primary | ICD-10-CM

## 2024-11-15 DIAGNOSIS — K64.8 INTERNAL HEMORRHOIDS: ICD-10-CM

## 2024-11-15 PROBLEM — K64.4 SKIN TAG OF PERIANAL REGION: Status: ACTIVE | Noted: 2024-11-15

## 2024-11-15 LAB — B-HCG UR QL: NEGATIVE

## 2024-11-15 PROCEDURE — 06LY7CC OCCLUSION OF HEMORRHOIDAL PLEXUS WITH EXTRALUMINAL DEVICE, VIA NATURAL OR ARTIFICIAL OPENING: ICD-10-PCS | Performed by: SURGERY

## 2024-11-15 PROCEDURE — 46080 SPHNCTROTMY ANAL DIV SPHNCTR: CPT | Performed by: SURGERY

## 2024-11-15 PROCEDURE — 0D8R0ZZ DIVISION OF ANAL SPHINCTER, OPEN APPROACH: ICD-10-PCS | Performed by: SURGERY

## 2024-11-15 RX ORDER — NEOSTIGMINE METHYLSULFATE 1 MG/ML
INJECTION INTRAVENOUS AS NEEDED
Status: DISCONTINUED | OUTPATIENT
Start: 2024-11-15 | End: 2024-11-15 | Stop reason: SURG

## 2024-11-15 RX ORDER — HYDROCODONE BITARTRATE AND ACETAMINOPHEN 5; 325 MG/1; MG/1
1 TABLET ORAL ONCE AS NEEDED
Status: DISCONTINUED | OUTPATIENT
Start: 2024-11-15 | End: 2024-11-15

## 2024-11-15 RX ORDER — SODIUM CHLORIDE, SODIUM LACTATE, POTASSIUM CHLORIDE, CALCIUM CHLORIDE 600; 310; 30; 20 MG/100ML; MG/100ML; MG/100ML; MG/100ML
INJECTION, SOLUTION INTRAVENOUS CONTINUOUS
Status: DISCONTINUED | OUTPATIENT
Start: 2024-11-15 | End: 2024-11-15

## 2024-11-15 RX ORDER — ROCURONIUM BROMIDE 10 MG/ML
INJECTION, SOLUTION INTRAVENOUS AS NEEDED
Status: DISCONTINUED | OUTPATIENT
Start: 2024-11-15 | End: 2024-11-15 | Stop reason: SURG

## 2024-11-15 RX ORDER — MIDAZOLAM HYDROCHLORIDE 1 MG/ML
INJECTION INTRAMUSCULAR; INTRAVENOUS
Status: COMPLETED
Start: 2024-11-15 | End: 2024-11-15

## 2024-11-15 RX ORDER — PROCHLORPERAZINE EDISYLATE 5 MG/ML
5 INJECTION INTRAMUSCULAR; INTRAVENOUS EVERY 8 HOURS PRN
Status: DISCONTINUED | OUTPATIENT
Start: 2024-11-15 | End: 2024-11-15

## 2024-11-15 RX ORDER — DEXAMETHASONE SODIUM PHOSPHATE 4 MG/ML
VIAL (ML) INJECTION AS NEEDED
Status: DISCONTINUED | OUTPATIENT
Start: 2024-11-15 | End: 2024-11-15 | Stop reason: SURG

## 2024-11-15 RX ORDER — BUPIVACAINE HYDROCHLORIDE AND EPINEPHRINE 5; 5 MG/ML; UG/ML
INJECTION, SOLUTION EPIDURAL; INTRACAUDAL; PERINEURAL AS NEEDED
Status: DISCONTINUED | OUTPATIENT
Start: 2024-11-15 | End: 2024-11-15 | Stop reason: HOSPADM

## 2024-11-15 RX ORDER — DIPHENHYDRAMINE HYDROCHLORIDE 50 MG/ML
12.5 INJECTION INTRAMUSCULAR; INTRAVENOUS AS NEEDED
Status: DISCONTINUED | OUTPATIENT
Start: 2024-11-15 | End: 2024-11-15

## 2024-11-15 RX ORDER — KETOROLAC TROMETHAMINE 30 MG/ML
INJECTION, SOLUTION INTRAMUSCULAR; INTRAVENOUS AS NEEDED
Status: DISCONTINUED | OUTPATIENT
Start: 2024-11-15 | End: 2024-11-15 | Stop reason: SURG

## 2024-11-15 RX ORDER — HEPARIN SODIUM 5000 [USP'U]/ML
5000 INJECTION, SOLUTION INTRAVENOUS; SUBCUTANEOUS ONCE
Status: COMPLETED | OUTPATIENT
Start: 2024-11-15 | End: 2024-11-15

## 2024-11-15 RX ORDER — ONDANSETRON 2 MG/ML
4 INJECTION INTRAMUSCULAR; INTRAVENOUS EVERY 6 HOURS PRN
Status: DISCONTINUED | OUTPATIENT
Start: 2024-11-15 | End: 2024-11-15

## 2024-11-15 RX ORDER — DEXAMETHASONE SODIUM PHOSPHATE 4 MG/ML
8 VIAL (ML) INJECTION ONCE
Status: COMPLETED | OUTPATIENT
Start: 2024-11-15 | End: 2024-11-15

## 2024-11-15 RX ORDER — ONDANSETRON 2 MG/ML
INJECTION INTRAMUSCULAR; INTRAVENOUS AS NEEDED
Status: DISCONTINUED | OUTPATIENT
Start: 2024-11-15 | End: 2024-11-15 | Stop reason: SURG

## 2024-11-15 RX ORDER — HYDROMORPHONE HYDROCHLORIDE 1 MG/ML
0.4 INJECTION, SOLUTION INTRAMUSCULAR; INTRAVENOUS; SUBCUTANEOUS EVERY 5 MIN PRN
Status: DISCONTINUED | OUTPATIENT
Start: 2024-11-15 | End: 2024-11-15

## 2024-11-15 RX ORDER — HYDROCODONE BITARTRATE AND ACETAMINOPHEN 5; 325 MG/1; MG/1
2 TABLET ORAL ONCE AS NEEDED
Status: DISCONTINUED | OUTPATIENT
Start: 2024-11-15 | End: 2024-11-15

## 2024-11-15 RX ORDER — OXYCODONE HYDROCHLORIDE 5 MG/1
5 TABLET ORAL EVERY 6 HOURS PRN
Qty: 20 TABLET | Refills: 0 | Status: SHIPPED | OUTPATIENT
Start: 2024-11-15

## 2024-11-15 RX ORDER — ACETAMINOPHEN 500 MG
1000 TABLET ORAL ONCE AS NEEDED
Status: DISCONTINUED | OUTPATIENT
Start: 2024-11-15 | End: 2024-11-15

## 2024-11-15 RX ORDER — MEPERIDINE HYDROCHLORIDE 25 MG/ML
12.5 INJECTION INTRAMUSCULAR; INTRAVENOUS; SUBCUTANEOUS AS NEEDED
Status: DISCONTINUED | OUTPATIENT
Start: 2024-11-15 | End: 2024-11-15

## 2024-11-15 RX ORDER — METRONIDAZOLE 500 MG/100ML
500 INJECTION, SOLUTION INTRAVENOUS ONCE
Status: COMPLETED | OUTPATIENT
Start: 2024-11-15 | End: 2024-11-15

## 2024-11-15 RX ORDER — LIDOCAINE HYDROCHLORIDE 10 MG/ML
INJECTION, SOLUTION EPIDURAL; INFILTRATION; INTRACAUDAL; PERINEURAL AS NEEDED
Status: DISCONTINUED | OUTPATIENT
Start: 2024-11-15 | End: 2024-11-15 | Stop reason: SURG

## 2024-11-15 RX ORDER — DEXAMETHASONE SODIUM PHOSPHATE 4 MG/ML
VIAL (ML) INJECTION
Status: COMPLETED
Start: 2024-11-15 | End: 2024-11-15

## 2024-11-15 RX ORDER — NALOXONE HYDROCHLORIDE 0.4 MG/ML
0.08 INJECTION, SOLUTION INTRAMUSCULAR; INTRAVENOUS; SUBCUTANEOUS AS NEEDED
Status: DISCONTINUED | OUTPATIENT
Start: 2024-11-15 | End: 2024-11-15

## 2024-11-15 RX ORDER — HYDROMORPHONE HYDROCHLORIDE 1 MG/ML
0.2 INJECTION, SOLUTION INTRAMUSCULAR; INTRAVENOUS; SUBCUTANEOUS EVERY 5 MIN PRN
Status: DISCONTINUED | OUTPATIENT
Start: 2024-11-15 | End: 2024-11-15

## 2024-11-15 RX ORDER — SCOLOPAMINE TRANSDERMAL SYSTEM 1 MG/1
1 PATCH, EXTENDED RELEASE TRANSDERMAL ONCE
Status: DISCONTINUED | OUTPATIENT
Start: 2024-11-15 | End: 2024-11-15 | Stop reason: HOSPADM

## 2024-11-15 RX ORDER — MIDAZOLAM HYDROCHLORIDE 1 MG/ML
1 INJECTION INTRAMUSCULAR; INTRAVENOUS EVERY 5 MIN PRN
Status: DISCONTINUED | OUTPATIENT
Start: 2024-11-15 | End: 2024-11-15

## 2024-11-15 RX ORDER — ACETAMINOPHEN 500 MG
1000 TABLET ORAL ONCE
Status: DISCONTINUED | OUTPATIENT
Start: 2024-11-15 | End: 2024-11-15 | Stop reason: HOSPADM

## 2024-11-15 RX ORDER — SENNA AND DOCUSATE SODIUM 50; 8.6 MG/1; MG/1
1 TABLET, FILM COATED ORAL DAILY
Qty: 30 TABLET | Refills: 0 | Status: SHIPPED | OUTPATIENT
Start: 2024-11-15

## 2024-11-15 RX ORDER — HYDROMORPHONE HYDROCHLORIDE 1 MG/ML
0.6 INJECTION, SOLUTION INTRAMUSCULAR; INTRAVENOUS; SUBCUTANEOUS EVERY 5 MIN PRN
Status: DISCONTINUED | OUTPATIENT
Start: 2024-11-15 | End: 2024-11-15

## 2024-11-15 RX ORDER — GLYCOPYRROLATE 0.2 MG/ML
INJECTION, SOLUTION INTRAMUSCULAR; INTRAVENOUS AS NEEDED
Status: DISCONTINUED | OUTPATIENT
Start: 2024-11-15 | End: 2024-11-15 | Stop reason: SURG

## 2024-11-15 RX ADMIN — METRONIDAZOLE 500 MG: 500 INJECTION, SOLUTION INTRAVENOUS at 13:58:00

## 2024-11-15 RX ADMIN — DEXAMETHASONE SODIUM PHOSPHATE 4 MG: 4 MG/ML VIAL (ML) INJECTION at 13:50:00

## 2024-11-15 RX ADMIN — DEXAMETHASONE SODIUM PHOSPHATE 8 MG: 4 MG/ML VIAL (ML) INJECTION at 14:35:00

## 2024-11-15 RX ADMIN — ROCURONIUM BROMIDE 45 MG: 10 INJECTION, SOLUTION INTRAVENOUS at 13:41:00

## 2024-11-15 RX ADMIN — ONDANSETRON 4 MG: 2 INJECTION INTRAMUSCULAR; INTRAVENOUS at 13:57:00

## 2024-11-15 RX ADMIN — KETOROLAC TROMETHAMINE 30 MG: 30 INJECTION, SOLUTION INTRAMUSCULAR; INTRAVENOUS at 14:20:00

## 2024-11-15 RX ADMIN — LIDOCAINE HYDROCHLORIDE 50 MG: 10 INJECTION, SOLUTION EPIDURAL; INFILTRATION; INTRACAUDAL; PERINEURAL at 13:41:00

## 2024-11-15 RX ADMIN — NEOSTIGMINE METHYLSULFATE 5 MG: 1 INJECTION INTRAVENOUS at 14:27:00

## 2024-11-15 RX ADMIN — GLYCOPYRROLATE 0.8 MG: 0.2 INJECTION, SOLUTION INTRAMUSCULAR; INTRAVENOUS at 14:27:00

## 2024-11-15 NOTE — DISCHARGE INSTRUCTIONS
Home Care Instructions  Hemorrhoidectomy/Anorectal  Dr Dodge.    MEDICATIONS  You will be given a prescription for pain. Take 1-2 tablets every 4-6 hours as needed. Pain medications will ease your pain, but you should expect some incisional pain for about 2-5 days. You should walk often, cough and take deep breathes.    DIET  You will begin a high fiber diet. If your surgeon has not outlined the high fiber diet, he will provide an instruction sheet on that topic. The instruction sheet will also recommend a fiber supplement such as Metamucil, Konsyl, or Citrucel. Take the fiber supplement as instructed twice daily.    WOUND CARE You will perform sitz baths two times a day for the first two days only. Sitz baths simply mean soaking your anus in a tub of warm-hot water for about 15 minutes. Sitz baths will clean the anal wound as well as relax the anal sphincter muscles, which will help minimize your pain. Be careful around the anal wound, especially if you have stitches on the outside. Gently pat your anus dry (never rub) or simply dry your anus with a blow-dryer set to cool/warm. Do not soak your anus beyond 15 minutes or perform sitz baths beyond the first two days. Otherwise you run the risk of melting your stitches. After the first two postoperative days, you may shower twice a day for up to 15 minutes.    ACTIVITY  After surgery, your driving reflexes will be slower, especially if you are taking pain medications. Therefore, you are restricted from driving until after your follow-up visit. You may walk about the house, go shopping, see a movie, or eat at a restaurant. You may also climb stairs, but no weight lifting, power-walking, jogging, or using the “Stair-Master”.    APPOINTMENT  Please call our office for an appointment in 3 weeks after surgery, unless otherwise instructed. This will allow ample time for the swelling and soreness to resolve before your wound is examined. There may be some blood mixed with  your stools. This is normal. If you begin passing stools with mostly blood, go to the [facility] Emergency Room. If you have fevers, chills, or if you are concerned about your wound, please call us immediately at [phone].      Thank you for entrusting us with your care.  [facility]     You received a drug called Toradol which is an Anti Inflammatory at: 2:20pm  If you are allowed to take Anti inflammatories:    Do not take any Anti Inflammatory like Motrin, Aleve or Ibuprofen until after: 8:20pm  Please report any suspected allergic reactions or bleeding issues to your doctor

## 2024-11-15 NOTE — H&P
Expand All Collapse All    Follow Up Visit Note     Active Problems      1. Anal fissure    2. Internal hemorrhoids          Chief Complaint        Chief Complaint   Patient presents with    [facility]       EP- Hemorrhoids and Anal Fissure f/u- states no changes since last visit in April, reports rectal pain and bleeding             History of Present Illness     The patient returns for continued evaluation management of her anal fissure.  I last saw the patient in April and recommended use of nifedipine.  Patient used it for a few weeks but stated she experienced no change in symptoms whatsoever.  The patient now wishes to proceed with sphincterotomy.     The patient denies fever, chills, chest pain, shortness of breath, dyspnea. The patient also denies hematemesis, melena, or hematochezia. The patient denies change in bowel or bladder habits. There is no complaint of hematuria or dysuria.     I discussed the risk, benefits, alternatives of surgery.  I discussed the anticipated postoperative recovery including dietary, activity, exercise, and lifestyle recommendations.  The patient's questions regarding surgical procedure were answered and the patient voiced understanding of the care plan.     Allergies  [place] is allergic to penicillins.     Past Medical / Surgical / Social / Family History    The past medical and past surgical history have been reviewed by me today.     Past Medical History       Past Medical History:    Acid reflux disease    ADHD    Anxiety    Bartholin cyst    Esophageal reflux    Migraine    Migraines         Past Surgical History         Past Surgical History:   Procedure Laterality Date    Abdominoplasty                Other surgical history         Rhinoplasty    Upper gi endoscopy,diagnosis        essentially normal per pt, reflux            The family history and social history have been reviewed by me today.     Family History         Family History   Problem  Relation Age of Onset    Anemia Mother      Diabetes Paternal Grandfather           Social Hx on file   Social History            Socioeconomic History    Marital status:    Tobacco Use    Smoking status: Former       Current packs/day: 0.00       Average packs/day: 1 pack/day for 17.0 years (17.0 ttl pk-yrs)       Types: Cigarettes       Start date: 2001       Quit date: 2018       Years since quittin.4    Smokeless tobacco: Never    Tobacco comments:       vaping   Vaping Use    Vaping status: Every Day   Substance and Sexual Activity    Alcohol use: No    Drug use: No    Sexual activity: Yes       Partners: Male       Birth control/protection: Vasectomy           Medications - Current      Current Outpatient Medications:     diazePAM 10 MG Oral Tab, Take 12 mg by mouth every evening., Disp: , Rfl:     diazePAM 10 MG Oral Tab, Take 7 mg by mouth every morning., Disp: , Rfl:     ubrogepant (UBRELVY) 100 MG Oral Tab, TAKE 1 TABLET BY MOUTH AT THE ONSET OF THE MIGRAINE AND CAN REPEAT IN 2 HOURS IF NEEDED, Disp: 30 tablet, Rfl: 0    AJOVY 225 MG/1.5ML Subcutaneous Solution Prefilled Syringe, INJECT 225 MG INTO THE SKIN EVERY 30 (THIRTY) DAYS., Disp: 1.5 mL, Rfl: 11    OnabotulinumtoxinA (BOTOX) 200 units Injection Recon Soln, Inject 155 units to face and neck every 12 weeks, Disp: 200 Units, Rfl: 3        Review of Systems  The Review of Systems has been reviewed by me during today.  Review of Systems   Constitutional:  Negative for chills, diaphoresis, fatigue, fever and unexpected weight change.   HENT:  Negative for hearing loss, nosebleeds, sore throat and trouble swallowing.    Respiratory:  Negative for apnea, cough, shortness of breath and wheezing.    Cardiovascular:  Negative for chest pain, palpitations and leg swelling.   Gastrointestinal:  Negative for abdominal distention, abdominal pain, anal bleeding, blood in stool, constipation, diarrhea, nausea and vomiting.   Genitourinary:   Negative for difficulty urinating, dysuria, frequency and urgency.   Musculoskeletal:  Negative for arthralgias and myalgias.   Skin:  Negative for color change and rash.   Neurological:  Negative for tremors, syncope and weakness.   Hematological:  Negative for adenopathy. Does not bruise/bleed easily.   Psychiatric/Behavioral:  Negative for behavioral problems and sleep disturbance.          Physical Findings   Temp 98 °F (36.7 °C) (Temporal)   Physical Exam  Vitals and nursing note reviewed. Exam conducted with a chaperone present.   Constitutional:       General: She is not in acute distress.     Appearance: She is well-developed.   HENT:      Head: Normocephalic and atraumatic.   Eyes:      General: No scleral icterus.  Neck:      Trachea: No tracheal deviation.   Cardiovascular:      Rate and Rhythm: Normal rate and regular rhythm.      Heart sounds: S1 normal and S2 normal. No murmur heard.  Pulmonary:      Effort: No tachypnea, accessory muscle usage or respiratory distress.      Breath sounds: No decreased breath sounds, wheezing, rhonchi or rales.   Genitourinary:     Exam position: Prone.      Rectum: No mass, tenderness, anal fissure, external hemorrhoid or internal hemorrhoid. Normal anal tone.           Comments: No Rectocele  No Rectovaginal Fistula  Anal Sphincter Intact  No Pruritis Ani  No Lichenification  No Abscess  No Fistula in ano  (+) Anterior Fissure  No Posterior Fissure     See Procedures:  Anoscopy deferred in anticipation of exam under anesthesia and due to patient discomfort  Skin:     General: Skin is warm and dry.   Neurological:      Mental Status: She is alert and oriented to person, place, and time.   Psychiatric:         Speech: Speech normal.         Behavior: Behavior normal. Behavior is cooperative.         Thought Content: Thought content normal.         Judgment: Judgment normal.             Assessment   1. Anal fissure    2. Internal hemorrhoids          Plan      The  patient will be scheduled for anorectal examination under anesthesia, lateral subcutaneous internal sphincterotomy, and rubber band ligation of internal hemorrhoids.     The gregorio-operative care plan was discussed with the patient, who voices understanding.  Activity and lifting recommendations were discussed in length.      The risks, benefits, and alternatives to the procedure were explained to the patient.  The risks explained include, but are not limited to, bleeding, infection, pain wound complications, recurrence, incorrect diagnosis, injury to adjacent organs and structures. We also discussed the possibile need for further therapeutic, diagnostic, or surgical intervention.  The patient voiced understanding, and after all questions were answered to the patient's satisfaction, the patient provided willing and informed consent to proceed.       Shan Dodge MD

## 2024-11-15 NOTE — PROGRESS NOTES
Patient is s/p general anesthetic for hemorrhoid banding ans sphicterotomy.    Post procedure patient had stridor, responsive to treatment including dexamethasone and racemic epinephrine x 2.  Maintained O2 saturation above 95% at all times.    /82   Pulse 81   Temp 97.9 °F (36.6 °C)   Resp 12   Ht 1.651 m (5' 5\")   Wt 69.9 kg (154 lb)   LMP 10/21/2024 (Exact Date)   SpO2 98%   BMI 25.63 kg/m²       Lungs clear.      Patient reports anxious feeling at times with some dyspnea, though resolved when seen.  Patient takes diazepam 10mg PO 8pm every evening, and took last evening.  Offered supplement diazepam considering daily use, but patient felt would not be overall helpful and prefer to avoid, and deferred.    Humidified oxygen in place which helps overall feeling.    Reports had mild URI two weeks ago, no cough, no fever at that time.    Impression:    s/p General anesthesia with OETT uneventful intraop, post procedure stridor.  Recent URI, though mild, may have been provocative for stridor.      Rec:  reasonable to discharge to Phase II and likely home.    Darren Mireles MD [facility]  Mobile   Pager [phone]  long range page

## 2024-11-15 NOTE — ANESTHESIA PROCEDURE NOTES
Airway  Date/Time: 11/15/2024 1:46 PM  Urgency: elective    Airway not difficult    General Information and Staff    Patient location during procedure: OR  Anesthesiologist: Darren Mireles MD  Resident/CRNA: Blessing Garcia CRNA  Performed: CRNA   Performed by: Blessing Garcia CRNA  Authorized by: Darren Mireles MD      Indications and Patient Condition  Indications for airway management: anesthesia  Spontaneous Ventilation: absent  Sedation level: deep  Preoxygenated: yes  Patient position: sniffing  Mask difficulty assessment: 1 - vent by mask    Final Airway Details  Final airway type: endotracheal airway      Successful airway: ETT  Cuffed: yes   Successful intubation technique: direct laryngoscopy  Facilitating devices/methods: intubating stylet and anterior pressure/BURP  Endotracheal tube insertion site: oral  Blade: Beny  Blade size: #3  ETT size (mm): 7.5    Cormack-Lehane Classification: grade I - full view of glottis  Placement verified by: capnometry   Measured from: lips  ETT to lips (cm): 23  Number of attempts at approach: 1

## 2024-11-15 NOTE — BRIEF OP NOTE
Pre-Operative Diagnosis: Anal fissure [K60.2]  Internal hemorrhoids [K64.8]     Post-Operative Diagnosis: Anal fissure [K60.2]Internal hemorrhoids [K64.8]      Procedure Performed:   ANAL EXAMINATION UNDER ANESTHESIA, HEMORRHOID BANDING TIMES THREE, AND LATERAL INTERNAL SPHINCTEROTOMY, EXCISION OF PERIANAL SKIN TAG    Surgeons and Role:     * Shan Dodge MD - Primary    Assistant(s):  PA: Brittney Jaeger PA     Surgical Findings: as above     Specimen: skin tag     Estimated Blood Loss: Blood Output: 2 mL (11/15/2024  2:22 PM)      Dictation Number:   [id number]    Shan Dodge MD  11/15/2024  2:23 PM

## 2024-11-15 NOTE — ANESTHESIA POSTPROCEDURE EVALUATION
[facility]    Fortunato Johnson Patient Status:  Hospital Outpatient Surgery   Age/Gender 44 year old female MRN [id number]   Location [facility] POST ANESTHESIA CARE UNIT Attending Shan Dodge MD   Hosp Day # 0 PCP Tiana Gibbs MD       Anesthesia Post-op Note    ANAL EXAMINATION UNDER ANESTHESIA, HEMORRHOID BANDING TIMES THREE, AND LATERAL INTERNAL SPHINCTEROTOMY, EXCISION OF PERIANAL SKIN TAG    Procedure Summary       Date: 11/15/24 Room / Location:  MAIN OR 12 /  MAIN OR    Anesthesia Start: 1338 Anesthesia Stop: 1457    Procedure: ANAL EXAMINATION UNDER ANESTHESIA, HEMORRHOID BANDING TIMES THREE, AND LATERAL INTERNAL SPHINCTEROTOMY, EXCISION OF PERIANAL SKIN TAG (Anus) Diagnosis:       Anal fissure      Internal hemorrhoids      (Anal fissure [K60.2]Internal hemorrhoids [K64.8])    Surgeons: Shan Dogde MD Anesthesiologist: Darren Mireles MD    Anesthesia Type: general ASA Status: 2            Anesthesia Type: general    Vitals Value Taken Time   /87 11/15/24 1519   Temp 97.9 °F (36.6 °C) 11/15/24 1519   Pulse 105 11/15/24 1519   Resp 16 11/15/24 1519   SpO2 97 % 11/15/24 1519   Vitals shown include unfiled device data.    Patient Location: PACU    Anesthesia Type: general    Airway Patency: patent    Postop Pain Control: adequate    Mental Status: mildly sedated but able to meaningfully participate in the post-anesthesia evaluation    Nausea/Vomiting: none    Cardiopulmonary/Hydration status: stable euvolemic    Complications: no apparent anesthesia related complications    Postop vital signs: stable    Comments: Report to RN. DASHA. Pt awake and alert, initially with notable stridor. Decadron IV and Racemic epi x2 given at bedside with ANIRUDH Garcia CRNA and Dr. Mireles. Pt noted improvement in breathing pattern prior to handoff. Continue to monitor.     Dental Exam: Unchanged from Preop    Patient to be discharged from PACU when criteria met.    Patient with episode of  stridorous breathing in PACU, some return later then dissipated.   Vitals signs stable on room air, as noted.    Reasonable to discharge home.    Darren Mireles MD [facility]  Mobile   Pager [phone]  long range page

## 2024-11-15 NOTE — ANESTHESIA PREPROCEDURE EVALUATION
PRE-OP EVALUATION    Patient Name: Fortunato Johnson    Admit Diagnosis: Anal fissure [K60.2]  Internal hemorrhoids [K64.8]    Pre-op Diagnosis: Anal fissure [K60.2]  Internal hemorrhoids [K64.8]    ANAL EXAMINATION UNDER ANESTHESIA, HEMORRHOID BANDING TIMES THREE, AND LATERAL INTERNAL SPHINCTEROTOMY    Anesthesia Procedure: ANAL EXAMINATION UNDER ANESTHESIA, HEMORRHOID BANDING TIMES THREE, AND LATERAL INTERNAL SPHINCTEROTOMY    Surgeons and Role:     * Shan Dodge MD - Primary    Pre-op vitals reviewed.  Temp: 98 °F (36.7 °C)  Pulse: 75  Resp: 16  BP: 110/85  SpO2: 96 %  Body mass index is 25.63 kg/m².    Current medications reviewed.  Hospital Medications:   acetaminophen (Tylenol Extra Strength) tab 1,000 mg  1,000 mg Oral Once    scopolamine (Transderm-Scop) 1 MG/3DAYS patch 1 patch  1 patch Transdermal Once    lactated ringers infusion   Intravenous Continuous    [COMPLETED] heparin (Porcine) 5000 UNIT/ML injection 5,000 Units  5,000 Units Subcutaneous Once    ceFAZolin (Ancef) 2g in 10mL IV syringe premix  2 g Intravenous Once    And    metRONIDAZOLE in sodium chloride 0.79% (Flagyl) 5 mg/mL IVPB premix 500 mg  500 mg Intravenous Once       Outpatient Medications:   Prescriptions Prior to Admission[1]    Allergies: Amoxicillin and Penicillins      Anesthesia Evaluation    Patient summary reviewed.    Anesthetic Complications  (-) history of anesthetic complications         GI/Hepatic/Renal    Negative GI/hepatic/renal ROS.  (+) GERD and well controlled                          Cardiovascular    Negative cardiovascular ROS.    Exercise tolerance: good     MET: >4                                           Endo/Other    Negative endo/other ROS.                              Pulmonary    Negative pulmonary ROS.                       Neuro/Psych    Negative neuro/psych ROS.                          Patient is undergoing endocrine evaluation for Cushings disease.  Advised delay planned test for next  week another week, able to do.  As per Epic:  Patient Active Problem List:     Chronic migraine     Attention deficit hyperactivity disorder (ADHD), predominantly inattentive type     Neck pain     Upper back pain     Vitamin D deficiency     Gastroesophageal reflux disease without esophagitis     Anxiety     Lesion of uvula     Oropharyngeal dysphagia     Anal fissure     Internal hemorrhoids with complication     Internal hemorrhoids          Past Surgical History:   Procedure Laterality Date    Abdominoplasty            Other surgical history      Rhinoplasty    Upper gi endoscopy,diagnosis  2019    essentially normal per pt, reflux     Social History     Socioeconomic History    Marital status:    Tobacco Use    Smoking status: Former     Current packs/day: 0.00     Average packs/day: 1 pack/day for 17.0 years (17.0 ttl pk-yrs)     Types: Cigarettes     Start date: 2001     Quit date: 2018     Years since quittin.6    Smokeless tobacco: Never    Tobacco comments:     vaping   Vaping Use    Vaping status: Former    Substances: Nicotine, Flavoring    Devices: Refillable tank   Substance and Sexual Activity    Alcohol use: No    Drug use: No    Sexual activity: Yes     Partners: Male     Birth control/protection: Vasectomy     History   Drug Use No     Available pre-op labs reviewed.               Airway      Mallampati: II  Mouth opening: >3 FB  TM distance: > 6 cm  Neck ROM: full Cardiovascular      Rhythm: regular  Rate: normal  (-) murmur   Dental  Comment: Dentition is grossly intact;  Patient does not demonstrate loose teeth to inspection.           Pulmonary  Comment: Unlabored ventilatory effort, no retractions.  Pulmonary exam normal.  Breath sounds clear to auscultation bilaterally.               Other findings              ASA: 2   Plan: general  NPO status verified and patient meets guidelines.        Comment: I explained intrinsic risks of general anesthesia, including nausea,  dental damage, sore throat, mouth injury,and hoarseness from airway management.  All questions were answered and understanding was demonstrated of risks.  Informed permission was obtained to proceed as documented in the signed consent form.     Plan/risks discussed with: patient and spouse                Present on Admission:  **None**             [1]   Medications Prior to Admission   Medication Sig Dispense Refill Last Dose/Taking    progesterone 100 MG Oral Cap Take 1 capsule (100 mg total) by mouth nightly.   11/14/2024 at 10:00 PM    hydrOXYzine 25 MG Oral Tab Take 1 tablet (25 mg total) by mouth 3 (three) times daily as needed for Itching.   11/14/2024 at 10:00 PM    diazePAM 10 MG Oral Tab Take 6 mg by mouth every evening.   11/14/2024 at  8:00 PM    OnabotulinumtoxinA (BOTOX) 200 units Injection Recon Soln Inject 155 units to face and neck every 12 weeks 200 Units 3 11/12/2024    ubrogepant (UBRELVY) 100 MG Oral Tab TAKE 1 TABLET BY MOUTH AT THE ONSET OF THE MIGRAINE AND CAN REPEAT IN 2 HOURS IF NEEDED 30 tablet 0 Unknown    AJOVY 225 MG/1.5ML Subcutaneous Solution Prefilled Syringe INJECT 225 MG INTO THE SKIN EVERY 30 (THIRTY) DAYS. 1.5 mL 11 Unknown

## 2024-11-16 NOTE — OPERATIVE REPORT
[facility]    PATIENT'S NAME: MARISELA VICTOR   ATTENDING PHYSICIAN: Shan Dodge M.D.   OPERATING PHYSICIAN: Shan Dodge M.D.   PATIENT ACCOUNT#:   [id number]    LOCATION:  [facility] 8 EDWP 10  MEDICAL RECORD #:   [id number]       YOB: 1980  ADMISSION DATE:       11/15/2024      OPERATION DATE:  11/15/2024    OPERATIVE REPORT      PREOPERATIVE DIAGNOSIS:    1.   Anal fissure.  2.   Internal hemorrhoids.  3.   Perianal skin tag.  POSTOPERATIVE DIAGNOSIS:    1.   Anal fissure.  2.   Internal hemorrhoids.  3.   Perianal skin tag.  PROCEDURE:    1.   Anorectal examination under anesthesia.  2.   Subcutaneous lateral internal sphincterotomy.  3.   Rubber band ligation of internal hemorrhoids in 3 locations.  4.   Excision of perianal skin tag.    ASSISTANT:  CASPER Oliver    ANESTHESIA:  General endotracheal anesthesia.    ANESTHESIOLOGIST:  Blessing Garcia CRNA    BLOOD LOSS:  Less than 2 mL.    COMPLICATIONS:  None apparent.    SPECIMENS:  Perianal skin tag.    DISPOSITION:  The patient tolerated the procedure without apparent complication.  Needle, sponge, and instrument counts were correct at the end of the procedure.  Preprocedure antibiotic and DVT prophylaxis administered per protocol.  Time-out was performed prior to initiating procedure, identifying correct patient, procedure, and surgeon.    FINDINGS:  The patient has perianal skin tag in the anterior midline.  The patient has prominent internal hemorrhoids in the right posterolateral, right anterolateral, and left lateral locations.  Additionally, the patient has elevated sphincter tone and there is persistent anterior midline anal fissure with sentinel pile.  At the completion of the operation, the tension on the internal sphincter is released.  The hemorrhoids are banded appropriately.    OPERATIVE TECHNIQUE:  The patient brought to the operating room and after induction of anesthesia, she was placed  in the prone jackknife position.  Buttocks were taped and spread in usual manner, and the perineum was prepped and draped in the usual sterile fashion.  Visual inspection of the perineum confirms the above findings.  Attention is initially turned to sphincterotomy.  A bivalve anal retractor is placed in the anorectal canal and oriented vertically, and the internal sphincter muscle is placed under tension.  The intersphincteric groove is palpated and an 11 blade scalpel is inserted between the internal-external sphincter and turned 90 degrees such that the cutting edge face is medially with an examining finger palpating the internal sphincter.  The muscle fibers are divided using sharp dissection.  Finger pressure is then used to hold hemostasis.  There is excellent release of the tension on the internal sphincter.  Once hemostasis is achieved, attention is turned to rubber band ligation of the internal hemorrhoids.  Using an O-ring ligator, rubber band is placed in the 3 internal hemorrhoids in the right posterolateral, right anterolateral, and left lateral position sequentially with good effect.  At this point, the rectal retractor is withdrawn.  Attention is turned to excision of the perianal skin tag.  Local anesthetic is injected.  Full-thickness incision is created in a circular fashion, excising the perianal skin tag in its entirety and in full thickness.  The specimen is removed from the operative field and submitted to Pathology for evaluation.  The incision is then closed using running 2-0 Vicryl suture.  Further local anesthetic is injected.  Needle, sponge, and instrument counts are correct.  Clean dressings are applied.  The patient is then awakened from anesthesia and brought to the recovery room in stable condition.  She tolerated the procedure without apparent complication.  Needle, sponge, and instrument counts are correct at the end of the procedure.  Operative findings were discussed with the  patient's family immediately upon conclusion of surgery.    Dictated By Shan Dodge M.D.  d: 11/15/2024 14:26:49  t: 11/15/2024 21:37:21  [facility] [id number]  PP/

## 2024-11-18 ENCOUNTER — PATIENT MESSAGE (OUTPATIENT)
Facility: LOCATION | Age: 44
End: 2024-11-18

## 2024-11-18 ENCOUNTER — PATIENT MESSAGE (OUTPATIENT)
Dept: SURGERY | Facility: CLINIC | Age: 44
End: 2024-11-18

## 2024-11-18 NOTE — TELEPHONE ENCOUNTER
Spoke with Fortunato who has been taking Miralax since Saturday 2x daily with no significant results as of today.  Patient states milk of magnesia causes her to be nauseous and so she did not take any of it.  Patient advised to take 5 oz of magnesium Citrate followed by an 8 oz glass of water.  Patient advised to call to office back if she requires further assistance.  Patient advised that office closes at 5 pm.  Patient verbalized understanding.

## 2024-11-19 NOTE — TELEPHONE ENCOUNTER
Called patient to see how she was doing since following advice for constipation relief.  LVM for patient to present to ER if symptoms worsen and intolerable.

## 2024-11-29 ENCOUNTER — OFFICE VISIT (OUTPATIENT)
Facility: LOCATION | Age: 44
End: 2024-11-29
Payer: COMMERCIAL

## 2024-11-29 VITALS
TEMPERATURE: 98 F | HEART RATE: 84 BPM | SYSTOLIC BLOOD PRESSURE: 106 MMHG | HEIGHT: 65 IN | WEIGHT: 154 LBS | DIASTOLIC BLOOD PRESSURE: 76 MMHG | OXYGEN SATURATION: 98 % | BODY MASS INDEX: 25.66 KG/M2

## 2024-11-29 DIAGNOSIS — Z09 POSTOP CHECK: Primary | ICD-10-CM

## 2024-11-29 PROCEDURE — 99024 POSTOP FOLLOW-UP VISIT: CPT | Performed by: PHYSICIAN ASSISTANT

## 2024-11-29 PROCEDURE — 3074F SYST BP LT 130 MM HG: CPT | Performed by: PHYSICIAN ASSISTANT

## 2024-11-29 PROCEDURE — 3078F DIAST BP <80 MM HG: CPT | Performed by: PHYSICIAN ASSISTANT

## 2024-11-29 PROCEDURE — 3008F BODY MASS INDEX DOCD: CPT | Performed by: PHYSICIAN ASSISTANT

## 2024-11-29 RX ORDER — SUMATRIPTAN SUCCINATE 100 MG/1
TABLET ORAL
COMMUNITY

## 2024-11-29 NOTE — PROGRESS NOTES
Post Operative Visit Note       Active Problems  1. Postop check         Chief Complaint   Chief Complaint   Patient presents with    Post-Op     PO -11/15 W/PBP,  ANAL EXAMINATION UNDER ANESTHESIA, HEMORRHOID BANDING TIMES THREE, AND LATERAL INTERNAL SPHINCTEROTOMY, EXCISION OF PERIANAL SKIN TAG, itching, no other symptoms           History of Present Illness   44 year old female presents for postop visit following anal exam under anesthesia, hemorrhoid banding x 3, lateral internal sphincterotomy, and excision of perianal skin tag on 11/15/2024 with Dr. Dodge.  Patient states she overall has been recovering well.  She reports pain has improved from immediately postop, and now she is experiencing some itching to the area.  She is no longer requiring narcotic.  Doing daily sitz bath's.  She reports bowel movements are normal for her, soft/loose 2-3 times per day.  She had some blood with stool initially postop which has since resolved.  She denies any fevers or chills.          Allergies  Fortunato is allergic to amoxicillin and penicillins.    Past Medical / Surgical / Social / Family History    The past medical and past surgical history have been reviewed by me today.     Past Medical History:    Acid reflux disease    ADHD    Allergic rhinitis    Anxiety    Attention deficit hyperactivity disorder (ADHD)    Bartholin cyst    Constipation    Esophageal reflux    Hx of motion sickness    Migraine    Migraines     Past Surgical History:   Procedure Laterality Date    Abdominoplasty      Colonoscopy  2021          Other surgical history      Rhinoplasty    Reconstr nose+altaf septal repair      Upper gi endoscopy,diagnosis      essentially normal per pt, reflux       The family history and social history have been reviewed by me today.    Family History   Problem Relation Age of Onset    Anemia Mother     Diabetes Paternal Grandfather      Social History     Socioeconomic History    Marital status:     Tobacco Use    Smoking status: Former     Current packs/day: 0.00     Average packs/day: 1 pack/day for 17.0 years (17.0 ttl pk-yrs)     Types: Cigarettes     Start date: 2001     Quit date: 2018     Years since quittin.6    Smokeless tobacco: Former    Tobacco comments:     vaping   Vaping Use    Vaping status: Former    Substances: Nicotine, Flavoring    Devices: Refillable tank   Substance and Sexual Activity    Alcohol use: No    Drug use: No    Sexual activity: Yes     Partners: Male     Birth control/protection: Vasectomy   Other Topics Concern    Caffeine Concern No    Exercise Yes     Comment: Exercise intolerance due to meds    Seat Belt No    Special Diet Yes     Comment: Many dietary restrictions    Stress Concern No    Weight Concern No        Current Outpatient Medications:     melatonin 3 MG Oral Tab, Take 1 tablet (3 mg total) by mouth nightly., Disp: , Rfl:     SUMAtriptan Succinate 100 MG Oral Tab, TAKE 1 TABLET BY MOUTH AT ONSET OF MIGRAINE. CAN REPEAT IN 2 HOURS IF NEEDED. MAX 3 TIMES A WEEK, Disp: , Rfl:     progesterone 100 MG Oral Cap, Take 1 capsule (100 mg total) by mouth nightly., Disp: , Rfl:     hydrOXYzine 25 MG Oral Tab, Take 1 tablet (25 mg total) by mouth 3 (three) times daily as needed for Itching., Disp: , Rfl:     diazePAM 10 MG Oral Tab, Take 6 mg by mouth every evening., Disp: , Rfl:     ubrogepant (UBRELVY) 100 MG Oral Tab, TAKE 1 TABLET BY MOUTH AT THE ONSET OF THE MIGRAINE AND CAN REPEAT IN 2 HOURS IF NEEDED, Disp: 30 tablet, Rfl: 0    AJOVY 225 MG/1.5ML Subcutaneous Solution Prefilled Syringe, INJECT 225 MG INTO THE SKIN EVERY 30 (THIRTY) DAYS., Disp: 1.5 mL, Rfl: 11    OnabotulinumtoxinA (BOTOX) 200 units Injection Recon Soln, Inject 155 units to face and neck every 12 weeks, Disp: 200 Units, Rfl: 3      Review of Systems  A 10 point Review of Systems has been completed by me today and is negative except as above in the HPI.    Physical Findings   /76 (BP  Location: Right arm, Patient Position: Sitting, Cuff Size: adult)   Pulse 84   Temp 98.4 °F (36.9 °C) (Temporal)   Ht 65\"   Wt 154 lb (69.9 kg)   LMP 11/16/2024 (Exact Date)   SpO2 98%   BMI 25.63 kg/m²   Gen/psych: alert and oriented, cooperative, no apparent distress  Cardiovascular: regular rate  Respiratory: respirations unlabored, no wheeze  Abdominal: soft, non-tender, non-distended, no guarding/rebound  Incision: Patient was examined in the prone jackknife position with chaperone present.  Incision is healing well without any erythema or drainage.  Vicryl suture is in place.         Assessment/Plan  1. Postop check        44 year old female presents for postop visit following anal exam under anesthesia, hemorrhoid banding x 3, lateral internal sphincterotomy, and excision of perianal skin tag on 11/15/2024 with Dr. Dodge.      The patient is doing well postoperatively  Incision is healing well without signs of infection.  2-0 Vicryl suture remains in place.  Discussed with patient that this will dissolve with time.  If it has not fallen out in 1 to 2 weeks, she may return to clinic to have it clipped.  Continue with local wound care including soap and water over incision.  Daily sitz bath's.  The patient is to continue with diet as tolerated.  Recommend incorporating fiber, such as Metamucil.  Continue with MiraLAX as needed for constipation.  Surgical pathology was discussed with the patient and consistent with benign fibroepithelial polyp.  All questions and concerns were answered.  The patient is to return to the clinic as needed.       Orders Placed This Encounter    melatonin 3 MG Oral Tab     Sig: Take 1 tablet (3 mg total) by mouth nightly.    SUMAtriptan Succinate 100 MG Oral Tab     Sig: TAKE 1 TABLET BY MOUTH AT ONSET OF MIGRAINE. CAN REPEAT IN 2 HOURS IF NEEDED. MAX 3 TIMES A WEEK        Imaging & Referrals   None    Follow Up  Return if symptoms worsen or fail to improve.    Yajaira  PAPITO De La Vega  [facility] General Surgery  11/29/2024  11:19 AM

## 2024-12-07 ENCOUNTER — HOSPITAL ENCOUNTER (EMERGENCY)
Age: 44
Discharge: HOME | End: 2024-12-07
Attending: EMERGENCY MEDICINE
Payer: COMMERCIAL

## 2024-12-07 VITALS
BODY MASS INDEX: 24.49 KG/M2 | HEART RATE: 92 BPM | TEMPERATURE: 98 F | OXYGEN SATURATION: 97 % | HEIGHT: 65 IN | DIASTOLIC BLOOD PRESSURE: 98 MMHG | WEIGHT: 147 LBS | SYSTOLIC BLOOD PRESSURE: 124 MMHG | RESPIRATION RATE: 18 BRPM

## 2024-12-07 DIAGNOSIS — K52.9 GASTROENTERITIS: Primary | ICD-10-CM

## 2024-12-07 LAB
ALBUMIN SERPL-MCNC: 4.6 G/DL (ref 3.2–4.8)
ALBUMIN/GLOB SERPL: 1.8 {RATIO} (ref 1–2)
ALP LIVER SERPL-CCNC: 89 U/L
ALT SERPL-CCNC: 70 U/L
ANION GAP SERPL CALC-SCNC: 8 MMOL/L (ref 0–18)
AST SERPL-CCNC: 40 U/L (ref ?–34)
BASOPHILS # BLD AUTO: 0.08 X10(3) UL (ref 0–0.2)
BASOPHILS NFR BLD AUTO: 0.6 %
BILIRUB SERPL-MCNC: 0.3 MG/DL (ref 0.3–1.2)
BILIRUB UR QL STRIP.AUTO: NEGATIVE
BUN BLD-MCNC: 5 MG/DL (ref 9–23)
C DIFF TOX B STL QL: NEGATIVE
CALCIUM BLD-MCNC: 9.7 MG/DL (ref 8.7–10.4)
CHLORIDE SERPL-SCNC: 108 MMOL/L (ref 98–112)
CLARITY UR REFRACT.AUTO: CLEAR
CO2 SERPL-SCNC: 22 MMOL/L (ref 21–32)
COLOR UR AUTO: YELLOW
CREAT BLD-MCNC: 0.73 MG/DL
EGFRCR SERPLBLD CKD-EPI 2021: 105 ML/MIN/1.73M2 (ref 60–?)
EOSINOPHIL # BLD AUTO: 0.16 X10(3) UL (ref 0–0.7)
EOSINOPHIL NFR BLD AUTO: 1.1 %
ERYTHROCYTE [DISTWIDTH] IN BLOOD BY AUTOMATED COUNT: 12.8 %
GLOBULIN PLAS-MCNC: 2.5 G/DL (ref 2–3.5)
GLUCOSE BLD-MCNC: 107 MG/DL (ref 70–99)
GLUCOSE UR STRIP.AUTO-MCNC: NEGATIVE MG/DL
HCT VFR BLD AUTO: 41.7 %
HGB BLD-MCNC: 15 G/DL
IMM GRANULOCYTES # BLD AUTO: 0.09 X10(3) UL (ref 0–1)
IMM GRANULOCYTES NFR BLD: 0.6 %
KETONES UR STRIP.AUTO-MCNC: NEGATIVE MG/DL
LEUKOCYTE ESTERASE UR QL STRIP.AUTO: NEGATIVE
LIPASE SERPL-CCNC: 41 U/L (ref 12–53)
LYMPHOCYTES # BLD AUTO: 1.94 X10(3) UL (ref 1–4)
LYMPHOCYTES NFR BLD AUTO: 13.6 %
MAGNESIUM SERPL-MCNC: 2.3 MG/DL (ref 1.6–2.6)
MCH RBC QN AUTO: 31.5 PG (ref 26–34)
MCHC RBC AUTO-ENTMCNC: 36 G/DL (ref 31–37)
MCV RBC AUTO: 87.6 FL
MONOCYTES # BLD AUTO: 0.76 X10(3) UL (ref 0.1–1)
MONOCYTES NFR BLD AUTO: 5.3 %
NEUTROPHILS # BLD AUTO: 11.27 X10 (3) UL (ref 1.5–7.7)
NEUTROPHILS # BLD AUTO: 11.27 X10(3) UL (ref 1.5–7.7)
NEUTROPHILS NFR BLD AUTO: 78.8 %
NITRITE UR QL STRIP.AUTO: NEGATIVE
OSMOLALITY SERPL CALC.SUM OF ELEC: 284 MOSM/KG (ref 275–295)
PH UR STRIP.AUTO: 7 [PH] (ref 5–8)
PLATELET # BLD AUTO: 459 10(3)UL (ref 150–450)
POTASSIUM SERPL-SCNC: 4 MMOL/L (ref 3.5–5.1)
PROT SERPL-MCNC: 7.1 G/DL (ref 5.7–8.2)
PROT UR STRIP.AUTO-MCNC: NEGATIVE MG/DL
RBC # BLD AUTO: 4.76 X10(6)UL
RBC UR QL AUTO: NEGATIVE
SODIUM SERPL-SCNC: 138 MMOL/L (ref 136–145)
SP GR UR STRIP.AUTO: 1.01 (ref 1–1.03)
UROBILINOGEN UR STRIP.AUTO-MCNC: 0.2 MG/DL
WBC # BLD AUTO: 14.3 X10(3) UL (ref 4–11)

## 2024-12-07 PROCEDURE — 80053 COMPREHEN METABOLIC PANEL: CPT | Performed by: EMERGENCY MEDICINE

## 2024-12-07 PROCEDURE — 87507 IADNA-DNA/RNA PROBE TQ 12-25: CPT | Performed by: EMERGENCY MEDICINE

## 2024-12-07 PROCEDURE — 85025 COMPLETE CBC W/AUTO DIFF WBC: CPT | Performed by: EMERGENCY MEDICINE

## 2024-12-07 PROCEDURE — 96365 THER/PROPH/DIAG IV INF INIT: CPT

## 2024-12-07 PROCEDURE — 99284 EMERGENCY DEPT VISIT MOD MDM: CPT

## 2024-12-07 PROCEDURE — 87493 C DIFF AMPLIFIED PROBE: CPT | Performed by: EMERGENCY MEDICINE

## 2024-12-07 PROCEDURE — 83735 ASSAY OF MAGNESIUM: CPT | Performed by: EMERGENCY MEDICINE

## 2024-12-07 PROCEDURE — 83690 ASSAY OF LIPASE: CPT | Performed by: EMERGENCY MEDICINE

## 2024-12-07 PROCEDURE — 81003 URINALYSIS AUTO W/O SCOPE: CPT | Performed by: EMERGENCY MEDICINE

## 2024-12-07 RX ORDER — ONDANSETRON 2 MG/ML
4 INJECTION INTRAMUSCULAR; INTRAVENOUS ONCE
Status: DISCONTINUED | OUTPATIENT
Start: 2024-12-07 | End: 2024-12-07

## 2024-12-07 RX ORDER — MAGNESIUM SULFATE HEPTAHYDRATE 40 MG/ML
2 INJECTION, SOLUTION INTRAVENOUS ONCE
Status: COMPLETED | OUTPATIENT
Start: 2024-12-07 | End: 2024-12-07

## 2024-12-07 RX ORDER — ONDANSETRON 4 MG/1
4 TABLET, ORALLY DISINTEGRATING ORAL EVERY 6 HOURS PRN
Qty: 15 TABLET | Refills: 0 | Status: SHIPPED | OUTPATIENT
Start: 2024-12-07 | End: 2024-12-14

## 2024-12-07 NOTE — ED PROVIDER NOTES
Patient Seen in: [facility] [place]      History     Chief Complaint   Patient presents with    Nausea/Vomiting/Diarrhea     Stated Complaint: nvd    Subjective:   HPI      44-year-old female presents for evaluation of nausea, vomiting and diarrhea for the past 3 days.  Children were sick with similar symptoms.  Patient has some mild abdominal pain.  No urinary symptoms.  No fever or chills.  Patient does have a remote history of C. difficile but no recent antibiotics.  She has been slowly trying to wean off high dose benzodiazepines for several years.  She is very anxious about her symptoms now but does not want any benzodiazepines but is requesting magnesium as that has helped her in the past    Objective:     Past Medical History:    Acid reflux disease    ADHD    Allergic rhinitis    Anxiety    Attention deficit hyperactivity disorder (ADHD)    Bartholin cyst    Constipation    Esophageal reflux    Hx of motion sickness    Migraine    Migraines              Past Surgical History:   Procedure Laterality Date    Abdominoplasty      Colonoscopy  2021          Other surgical history      Rhinoplasty    Reconstr nose+altaf septal repair      Upper gi endoscopy,diagnosis      essentially normal per pt, reflux                Social History     Socioeconomic History    Marital status:    Tobacco Use    Smoking status: Former     Current packs/day: 0.00     Average packs/day: 1 pack/day for 17.0 years (17.0 ttl pk-yrs)     Types: Cigarettes     Start date: 2001     Quit date: 2018     Years since quittin.6    Smokeless tobacco: Former    Tobacco comments:     vaping   Vaping Use    Vaping status: Former    Substances: Nicotine, Flavoring    Devices: Refillable tank   Substance and Sexual Activity    Alcohol use: No    Drug use: No    Sexual activity: Yes     Partners: Male     Birth control/protection: Vasectomy   Other Topics Concern    Caffeine Concern No    Exercise  Yes     Comment: Exercise intolerance due to meds    Seat Belt No    Special Diet Yes     Comment: Many dietary restrictions    Stress Concern No    Weight Concern No     Social Drivers of Health     Financial Resource Strain: Low Risk  (11/18/2024)    Received from [facility]    Overall Financial Resource Strain (CARDIA)     Difficulty of Paying Living Expenses: Not hard at all   Food Insecurity: No Food Insecurity (11/18/2024)    Received from [facility]    Hunger Vital Sign     Worried About Running Out of Food in the Last Year: Never true     Ran Out of Food in the Last Year: Never true   Transportation Needs: No Transportation Needs (11/18/2024)    Received from [facility]    PRAPARE - Transportation     Lack of Transportation (Medical): No     Lack of Transportation (Non-Medical): No   Housing Stability: Low Risk  (4/1/2024)    [facility] from [facility], [facility]    Housing Stability Vital Sign     Unable to Pay for Housing in the Last Year: No     Number of Places Lived in the Last Year: 2     Unstable Housing in the Last Year: No                  Physical Exam     ED Triage Vitals [12/07/24 1230]   BP (!) 124/98   Pulse 92   Resp 18   Temp 97.8 °F (36.6 °C)   Temp src Temporal   SpO2 97 %   O2 Device None (Room air)       Current Vitals:   Vital Signs  BP: (!) 124/98  Pulse: 92  Resp: 18  Temp: 97.8 °F (36.6 °C)  Temp src: Temporal    Oxygen Therapy  SpO2: 97 %  O2 Device: None (Room air)        Physical Exam  General: Alert, oriented, no apparent distress  HEENT:  Pupils equal reactive.  Extraocular motions intact. MMM.  Neck: Supple  Lungs: Clear to auscultation bilaterally.  Heart: Regular rate and rhythm.  Abdomen: Soft, nontender.   Skin: No rash.  No edema.  Neurologic: No focal neurologic deficits.  Normal speech pattern  Musculoskeletal: No tenderness or deformity noted.  Full range of  motion throughout.        ED Course     Labs Reviewed   COMP METABOLIC PANEL (14) - Abnormal; Notable for the following components:       Result Value    Glucose 107 (*)     BUN 5 (*)     AST 40 (*)     ALT 70 (*)     All other components within normal limits   CBC WITH DIFFERENTIAL WITH PLATELET - Abnormal; Notable for the following components:    WBC 14.3 (*)     .0 (*)     Neutrophil Absolute Prelim 11.27 (*)     Neutrophil Absolute 11.27 (*)     All other components within normal limits   LIPASE - Normal   MAGNESIUM - Normal   URINALYSIS WITH CULTURE REFLEX   RAINBOW DRAW LAVENDER   RAINBOW DRAW LIGHT GREEN   GI STOOL PANEL BY PCR   C. DIFFICILE(TOXIGENIC)PCR                   MDM      Differential diagnosis includes C. difficile colitis, viral gastroenteritis, dehydration, metabolic disturbance    Abdomen is non-tender on initial exam      Medications   ondansetron (Zofran) 4 MG/2ML injection 4 mg (4 mg Intravenous Not Given 12/7/24 1233)   sodium chloride 0.9 % IV bolus 1,000 mL (1,000 mL Intravenous New Bag 12/7/24 1240)   magnesium sulfate in sterile water for injection 2 g/50mL IVPB premix 2 g (0 g Intravenous Stopped 12/7/24 1335)     Chemistry with normal electrolytes and renal function.  Lipase normal.  Urine negative.  CBC with a mild leukocytosis.    Stool sample pending.  I suspect this is viral but we will rule out C. Difficile given history.    Zofran for nausea and Imodium for diarrhea.  Return if symptoms are intractable or if new symptoms develop    Medical Decision Making  Amount and/or Complexity of Data Reviewed  Independent Historian: spouse        Disposition and Plan     Clinical Impression:  1. Gastroenteritis         Disposition:  Discharge  12/7/2024  1:57 pm    Follow-up:  Tiana Gibbs MD  [address] [facility]  [address] [place] [id number]  [phone]    Call in 2 day(s)  As needed          Medications Prescribed:  Current Discharge Medication List        START taking these  medications    Details   ondansetron 4 MG Oral Tablet Dispersible Take 1 tablet (4 mg total) by mouth every 6 (six) hours as needed for Nausea.  Qty: 15 tablet, Refills: 0                 Supplementary Documentation:

## 2024-12-07 NOTE — ED INITIAL ASSESSMENT (HPI)
Pt to ed with c/o nausea, vomiting, diarrhea and dizziness x 4 days. Unknown fevers recently on ABX for hemorrhoid surgery on 11/15, hx of c-diff (2021). Unknown fever, children sick at home with same symptoms. + anxiety

## 2024-12-07 NOTE — DISCHARGE INSTRUCTIONS
Take Imodium for diarrhea.      Try hydroxyzine for nausea as well as anxiety    Stool cultures should be available later today

## 2024-12-08 LAB
ADENOVIRUS F 40/41 PCR: NEGATIVE
ASTROVIRUS PCR: NEGATIVE
C CAYETANENSIS DNA SPEC QL NAA+PROBE: NEGATIVE
CAMPY SP DNA.DIARRHEA STL QL NAA+PROBE: NEGATIVE
CRYPTOSP DNA SPEC QL NAA+PROBE: NEGATIVE
EAEC PAA PLAS AGGR+AATA ST NAA+NON-PRB: NEGATIVE
EC STX1+STX2 + H7 FLIC SPEC NAA+PROBE: NEGATIVE
ENTAMOEBA HISTOLYTICA PCR: NEGATIVE
EPEC EAE GENE STL QL NAA+NON-PROBE: NEGATIVE
ETEC LTA+ST1A+ST1B TOX ST NAA+NON-PROBE: NEGATIVE
GIARDIA LAMBLIA PCR: NEGATIVE
NOROVIRUS GI/GII PCR: NEGATIVE
P SHIGELLOIDES DNA STL QL NAA+PROBE: NEGATIVE
ROTAVIRUS A PCR: NEGATIVE
SALMONELLA DNA SPEC QL NAA+PROBE: NEGATIVE
SAPOVIRUS PCR: NEGATIVE
SHIGELLA SP+EIEC IPAH ST NAA+NON-PROBE: NEGATIVE
V CHOLERAE DNA SPEC QL NAA+PROBE: NEGATIVE
VIBRIO DNA SPEC NAA+PROBE: NEGATIVE
YERSINIA DNA SPEC NAA+PROBE: NEGATIVE

## 2024-12-09 ENCOUNTER — HOSPITAL ENCOUNTER (EMERGENCY)
Age: 44
Discharge: HOME | End: 2024-12-09
Payer: COMMERCIAL

## 2024-12-09 VITALS
WEIGHT: 143 LBS | RESPIRATION RATE: 18 BRPM | HEART RATE: 75 BPM | BODY MASS INDEX: 23.82 KG/M2 | OXYGEN SATURATION: 97 % | DIASTOLIC BLOOD PRESSURE: 76 MMHG | SYSTOLIC BLOOD PRESSURE: 108 MMHG | HEIGHT: 65 IN | TEMPERATURE: 99 F

## 2024-12-09 DIAGNOSIS — K52.9 GASTROENTERITIS: Primary | ICD-10-CM

## 2024-12-09 LAB
ALBUMIN SERPL-MCNC: 4.9 G/DL (ref 3.2–4.8)
ALBUMIN/GLOB SERPL: 1.8 {RATIO} (ref 1–2)
ALP LIVER SERPL-CCNC: 93 U/L
ALT SERPL-CCNC: 83 U/L
ANION GAP SERPL CALC-SCNC: 9 MMOL/L (ref 0–18)
AST SERPL-CCNC: 48 U/L (ref ?–34)
BASOPHILS # BLD AUTO: 0.09 X10(3) UL (ref 0–0.2)
BASOPHILS NFR BLD AUTO: 0.5 %
BILIRUB SERPL-MCNC: 0.4 MG/DL (ref 0.3–1.2)
BUN BLD-MCNC: 7 MG/DL (ref 9–23)
CALCIUM BLD-MCNC: 9.8 MG/DL (ref 8.7–10.4)
CHLORIDE SERPL-SCNC: 109 MMOL/L (ref 98–112)
CO2 SERPL-SCNC: 20 MMOL/L (ref 21–32)
CREAT BLD-MCNC: 0.71 MG/DL
EGFRCR SERPLBLD CKD-EPI 2021: 108 ML/MIN/1.73M2 (ref 60–?)
EOSINOPHIL # BLD AUTO: 0.06 X10(3) UL (ref 0–0.7)
EOSINOPHIL NFR BLD AUTO: 0.3 %
ERYTHROCYTE [DISTWIDTH] IN BLOOD BY AUTOMATED COUNT: 12.8 %
GLOBULIN PLAS-MCNC: 2.7 G/DL (ref 2–3.5)
GLUCOSE BLD-MCNC: 93 MG/DL (ref 70–99)
HCG SERPL QL: NEGATIVE
HCT VFR BLD AUTO: 43.4 %
HGB BLD-MCNC: 15.6 G/DL
IMM GRANULOCYTES # BLD AUTO: 0.19 X10(3) UL (ref 0–1)
IMM GRANULOCYTES NFR BLD: 1.1 %
LYMPHOCYTES # BLD AUTO: 1.24 X10(3) UL (ref 1–4)
LYMPHOCYTES NFR BLD AUTO: 6.9 %
MCH RBC QN AUTO: 31.5 PG (ref 26–34)
MCHC RBC AUTO-ENTMCNC: 35.9 G/DL (ref 31–37)
MCV RBC AUTO: 87.7 FL
MONOCYTES # BLD AUTO: 0.79 X10(3) UL (ref 0.1–1)
MONOCYTES NFR BLD AUTO: 4.4 %
NEUTROPHILS # BLD AUTO: 15.68 X10 (3) UL (ref 1.5–7.7)
NEUTROPHILS # BLD AUTO: 15.68 X10(3) UL (ref 1.5–7.7)
NEUTROPHILS NFR BLD AUTO: 86.8 %
OSMOLALITY SERPL CALC.SUM OF ELEC: 284 MOSM/KG (ref 275–295)
PLATELET # BLD AUTO: 498 10(3)UL (ref 150–450)
POTASSIUM SERPL-SCNC: 4 MMOL/L (ref 3.5–5.1)
PROT SERPL-MCNC: 7.6 G/DL (ref 5.7–8.2)
RBC # BLD AUTO: 4.95 X10(6)UL
SODIUM SERPL-SCNC: 138 MMOL/L (ref 136–145)
WBC # BLD AUTO: 18.1 X10(3) UL (ref 4–11)

## 2024-12-09 PROCEDURE — 96361 HYDRATE IV INFUSION ADD-ON: CPT

## 2024-12-09 PROCEDURE — 96375 TX/PRO/DX INJ NEW DRUG ADDON: CPT

## 2024-12-09 PROCEDURE — 84703 CHORIONIC GONADOTROPIN ASSAY: CPT | Performed by: NURSE PRACTITIONER

## 2024-12-09 PROCEDURE — 85025 COMPLETE CBC W/AUTO DIFF WBC: CPT | Performed by: NURSE PRACTITIONER

## 2024-12-09 PROCEDURE — 99284 EMERGENCY DEPT VISIT MOD MDM: CPT

## 2024-12-09 PROCEDURE — 96374 THER/PROPH/DIAG INJ IV PUSH: CPT

## 2024-12-09 PROCEDURE — 80053 COMPREHEN METABOLIC PANEL: CPT | Performed by: NURSE PRACTITIONER

## 2024-12-09 RX ORDER — KETOROLAC TROMETHAMINE 15 MG/ML
15 INJECTION, SOLUTION INTRAMUSCULAR; INTRAVENOUS ONCE
Status: COMPLETED | OUTPATIENT
Start: 2024-12-09 | End: 2024-12-09

## 2024-12-09 RX ORDER — ONDANSETRON 2 MG/ML
4 INJECTION INTRAMUSCULAR; INTRAVENOUS ONCE
Status: COMPLETED | OUTPATIENT
Start: 2024-12-09 | End: 2024-12-09

## 2024-12-09 RX ORDER — ONDANSETRON 4 MG/1
4 TABLET, ORALLY DISINTEGRATING ORAL EVERY 4 HOURS PRN
Qty: 10 TABLET | Refills: 0 | Status: SHIPPED | OUTPATIENT
Start: 2024-12-09 | End: 2024-12-11

## 2024-12-09 NOTE — DISCHARGE INSTRUCTIONS
Abdominal Pain:     Ondansetron 4 mg oral disintegrating tablet.  1 tablet orally every 8 hours as needed for nausea and vomiting.          Recommend bland diet such as the BRAT diet: Bananas, rice, applesauce, and toast. Advance as tolerated.     Encourage fluids.     Follow-up with your primary care provider in2-3 days or sooner if necessary.     Please monitor for and seek medical attention with severe abdominal pain, persistent vomiting and diarrhea with concerns for dehydration, inability to tolerate oral intake, blood in vomit or stool, dark black vomit or stool, lightheadedness, dizziness, sensation of passing out, passing out, or any other concerns.

## 2024-12-09 NOTE — ED PROVIDER NOTES
Patient Seen in: [facility] In [place]    History     Chief Complaint   Patient presents with    Nausea/vomiting     Stated Complaint: n/v was seen here saturday    HPI    Patient complains of n/v that began Friday, was seen on Saturday for the same.  Discharged home  with information on gastroenteritis . after declining Zofran, has been trying oral hydration which is not working.  Pt did  not know there was a prescription for zofran at the pharmacy so she didn't pick it up to ttaken it.   Ppt esteban any abdominal pain, but nausea       Past Medical History:    Acid reflux disease    ADHD    Allergic rhinitis    Anxiety    Attention deficit hyperactivity disorder (ADHD)    Bartholin cyst    Constipation    Esophageal reflux    Hx of motion sickness    Migraine    Migraines       Past Surgical History:   Procedure Laterality Date    Abdominoplasty      Colonoscopy  2021          Other surgical history      Rhinoplasty    Reconstr nose+altaf septal repair      Upper gi endoscopy,diagnosis      essentially normal per pt, reflux            Family History   Problem Relation Age of Onset    Anemia Mother     Diabetes Paternal Grandfather        Social History     Socioeconomic History    Marital status:    Tobacco Use    Smoking status: Former     Current packs/day: 0.00     Average packs/day: 1 pack/day for 17.0 years (17.0 ttl pk-yrs)     Types: Cigarettes     Start date: 2001     Quit date: 2018     Years since quittin.6    Smokeless tobacco: Former    Tobacco comments:     vaping   Vaping Use    Vaping status: Former    Substances: Nicotine, Flavoring    Devices: Refillable tank   Substance and Sexual Activity    Alcohol use: No    Drug use: No    Sexual activity: Yes     Partners: Male     Birth control/protection: Vasectomy   Other Topics Concern    Caffeine Concern No    Exercise Yes     Comment: Exercise intolerance due to meds    Seat Belt No    Special Diet Yes      Comment: Many dietary restrictions    Stress Concern No    Weight Concern No     Social Drivers of Health     Financial Resource Strain: Low Risk  (11/18/2024)    Received from [facility]    Overall Financial Resource Strain (CARDIA)     Difficulty of Paying Living Expenses: Not hard at all   Food Insecurity: No Food Insecurity (11/18/2024)    Received from [facility]    Hunger Vital Sign     Worried About Running Out of Food in the Last Year: Never true     Ran Out of Food in the Last Year: Never true   Transportation Needs: No Transportation Needs (11/18/2024)    Received from [facility]    PRAPARE - Transportation     Lack of Transportation (Medical): No     Lack of Transportation (Non-Medical): No   Housing Stability: Low Risk  (4/1/2024)    Received from [facility], [facility]    Housing Stability Vital Sign     Unable to Pay for Housing in the Last Year: No     Number of Places Lived in the Last Year: 2     Unstable Housing in the Last Year: No       Review of Systems    Positive for stated complaint: n/v was seen here saturday  Other systems are as noted in HPI.  Constitutional and vital signs reviewed.      All other systems reviewed and negative except as noted above.    PSFH elements reviewed from today and agreed except as otherwise stated in HPI.    Physical Exam     ED Triage Vitals [12/09/24 1243]   /81   Pulse 92   Resp 16   Temp 98.6 °F (37 °C)   Temp src Temporal   SpO2 96 %   O2 Device None (Room air)       Current:/79   Pulse 75   Temp 98.6 °F (37 °C) (Temporal)   Resp 18   Ht 165.1 cm (5' 5\")   Wt 64.9 kg   LMP 11/16/2024 (Exact Date)   SpO2 96%   BMI 23.80 kg/m²   Pulse ox 96% on RA         Physical Exam  General Appearance: alert, no distress  Eyes: pupils equal and round no pallor or injection  ENT, Mouth: mucous membranes moist  Respiratory: there are no  retractions, lungs are clear to auscultation  Cardiovascular: regular rate and rhythm    Gastrointestinal: soft, non tender, no mass, normal bowel sounds, no swelling, no ecchymosis, no pain at mcburney's point, negative stein sign.  Neurological: II-XII grossly intact  no focal deficits  Skin: warm and dry, no rashes.  Musculoskeletal: neck is supple non tender        Extremities are symmetrical, full range of motion  Psychiatric: patient is pleasant, there is no agitation         ED Course     Labs Reviewed   COMP METABOLIC PANEL (14) - Abnormal; Notable for the following components:       Result Value    CO2 20.0 (*)     BUN 7 (*)     AST 48 (*)     ALT 83 (*)     Albumin 4.9 (*)     All other components within normal limits   CBC WITH DIFFERENTIAL WITH PLATELET - Abnormal; Notable for the following components:    WBC 18.1 (*)     .0 (*)     Neutrophil Absolute Prelim 15.68 (*)     Neutrophil Absolute 15.68 (*)     All other components within normal limits   HCG, BETA SUBUNIT, QUAL - Normal   URINALYSIS WITH CULTURE REFLEX     I have personally  reviewed available prior medical records for any recent pertinent discharge summaries/testing. Patient/family updated on results and plan, a verbalized understanding and agreement with the plan.  I explained to the patient that emergent conditions may arise and to go to the ER for new, worsening or any persistent conditions. I've explained the importance of taking all medicatons as prescribed, follow up, and return precuations,  All questions answered.    Please note that this report has been produced using speech recognition software and may contain errors related to that system including, but not limited to, errors in grammar, punctuation, and spelling, as well as words and phrases that possibly may have been recognized inappropriately.  If there are any questions or concerns, contact the dictating provider for clarification.  MDM     Sources of the medical  history included the patient and      Differential diagnosis before testing included gastroenteritis, obstruction a medical condition that poses a threat to life    I did discuss a CT scan with the patient.  She is declining it at this time.    I reviewed prior external notes including pt was seen and dx with gastroenteritis, no zofran picked up.  Pt here with the same.  Wbc count 18, I believe this is an inflammatory process.  Pt is feeling much relief with Zofran.  She is tolerating ice chips at this time.  No vomiting.  She is also starting to feel slightly hungry.           I have discussed the patient's results of testing, differential diagnosis and treatment plan, they expressed clear understanding of these instructions and agreed to the plan provided    The 21st Century Cures Act makes medical notes like these available to patients in the interest of transparency. Please be advised this is a medical document. Medical documents are intended to carry relevant information, facts as evident, and the clinical opinion of the practitioner. The medical note is intended as peer to peer communication and may appear blunt or direct. It is written in medical language and may contain abbreviations or verbiage that are unfamiliar.      Disposition and Plan     Clinical Impression:  1. Gastroenteritis        Disposition:  Discharge    Follow-up:  Tiana Gibbs MD  [address] [facility]  [address] [place] [id number]  [phone]    Follow up        Medications Prescribed:  Current Discharge Medication List        START taking these medications    Details   !! ondansetron 4 MG Oral Tablet Dispersible Take 1 tablet (4 mg total) by mouth every 4 (four) hours as needed for Nausea.  Qty: 10 tablet, Refills: 0       !! - Potential duplicate medications found. Please discuss with provider.

## 2024-12-09 NOTE — ED INITIAL ASSESSMENT (HPI)
Seen here Saturday- c/o n/v/d-- began on Friday and has continued- tolerated small amts of fluid  Given prescription for Zofran on Saturday but did not  or try at home

## 2024-12-10 ENCOUNTER — PATIENT MESSAGE (OUTPATIENT)
Dept: SURGERY | Facility: CLINIC | Age: 44
End: 2024-12-10

## 2024-12-10 ENCOUNTER — HOSPITAL ENCOUNTER (OUTPATIENT)
Facility: HOSPITAL | Age: 44
Setting detail: OBSERVATION
Discharge: HOME | End: 2024-12-11
Attending: EMERGENCY MEDICINE | Admitting: FAMILY MEDICINE
Payer: COMMERCIAL

## 2024-12-10 DIAGNOSIS — E87.20 METABOLIC ACIDOSIS: ICD-10-CM

## 2024-12-10 DIAGNOSIS — R11.10 INTRACTABLE VOMITING: Primary | ICD-10-CM

## 2024-12-10 LAB
ALBUMIN SERPL-MCNC: 4.1 G/DL (ref 3.2–4.8)
ALBUMIN/GLOB SERPL: 1.3 {RATIO} (ref 1–2)
ALP LIVER SERPL-CCNC: 76 U/L
ALT SERPL-CCNC: 69 U/L
ANION GAP SERPL CALC-SCNC: 11 MMOL/L (ref 0–18)
AST SERPL-CCNC: 18 U/L (ref ?–34)
BASOPHILS # BLD AUTO: 0.08 X10(3) UL (ref 0–0.2)
BASOPHILS NFR BLD AUTO: 0.6 %
BILIRUB SERPL-MCNC: 0.3 MG/DL (ref 0.3–1.2)
BUN BLD-MCNC: <5 MG/DL (ref 9–23)
CALCIUM BLD-MCNC: 9.1 MG/DL (ref 8.7–10.4)
CHLORIDE SERPL-SCNC: 109 MMOL/L (ref 98–112)
CO2 SERPL-SCNC: 20 MMOL/L (ref 21–32)
CREAT BLD-MCNC: 0.74 MG/DL
EGFRCR SERPLBLD CKD-EPI 2021: 103 ML/MIN/1.73M2 (ref 60–?)
EOSINOPHIL # BLD AUTO: 0.05 X10(3) UL (ref 0–0.7)
EOSINOPHIL NFR BLD AUTO: 0.4 %
ERYTHROCYTE [DISTWIDTH] IN BLOOD BY AUTOMATED COUNT: 12.7 %
GLOBULIN PLAS-MCNC: 3.1 G/DL (ref 2–3.5)
GLUCOSE BLD-MCNC: 88 MG/DL (ref 70–99)
HCT VFR BLD AUTO: 39.3 %
HGB BLD-MCNC: 13.6 G/DL
IMM GRANULOCYTES # BLD AUTO: 0.08 X10(3) UL (ref 0–1)
IMM GRANULOCYTES NFR BLD: 0.6 %
LIPASE SERPL-CCNC: 37 U/L (ref 12–53)
LYMPHOCYTES # BLD AUTO: 1.93 X10(3) UL (ref 1–4)
LYMPHOCYTES NFR BLD AUTO: 14.6 %
MCH RBC QN AUTO: 30.5 PG (ref 26–34)
MCHC RBC AUTO-ENTMCNC: 34.6 G/DL (ref 31–37)
MCV RBC AUTO: 88.1 FL
MONOCYTES # BLD AUTO: 0.73 X10(3) UL (ref 0.1–1)
MONOCYTES NFR BLD AUTO: 5.5 %
NEUTROPHILS # BLD AUTO: 10.32 X10 (3) UL (ref 1.5–7.7)
NEUTROPHILS # BLD AUTO: 10.32 X10(3) UL (ref 1.5–7.7)
NEUTROPHILS NFR BLD AUTO: 78.3 %
PLATELET # BLD AUTO: 433 10(3)UL (ref 150–450)
POTASSIUM SERPL-SCNC: 3.7 MMOL/L (ref 3.5–5.1)
PROT SERPL-MCNC: 7.2 G/DL (ref 5.7–8.2)
RBC # BLD AUTO: 4.46 X10(6)UL
SODIUM SERPL-SCNC: 140 MMOL/L (ref 136–145)
WBC # BLD AUTO: 13.2 X10(3) UL (ref 4–11)

## 2024-12-10 RX ORDER — DEXTROSE MONOHYDRATE AND SODIUM CHLORIDE 5; .45 G/100ML; G/100ML
INJECTION, SOLUTION INTRAVENOUS CONTINUOUS
Status: ACTIVE | OUTPATIENT
Start: 2024-12-10 | End: 2024-12-10

## 2024-12-10 RX ORDER — ONDANSETRON 2 MG/ML
4 INJECTION INTRAMUSCULAR; INTRAVENOUS EVERY 6 HOURS PRN
Status: DISCONTINUED | OUTPATIENT
Start: 2024-12-10 | End: 2024-12-10

## 2024-12-10 RX ORDER — SUMATRIPTAN 50 MG/1
50 TABLET, FILM COATED ORAL EVERY 2 HOUR PRN
Status: DISCONTINUED | OUTPATIENT
Start: 2024-12-10 | End: 2024-12-12

## 2024-12-10 RX ORDER — ONDANSETRON 2 MG/ML
4 INJECTION INTRAMUSCULAR; INTRAVENOUS ONCE
Status: COMPLETED | OUTPATIENT
Start: 2024-12-10 | End: 2024-12-10

## 2024-12-10 RX ORDER — MORPHINE SULFATE 2 MG/ML
2 INJECTION, SOLUTION INTRAMUSCULAR; INTRAVENOUS EVERY 2 HOUR PRN
Status: DISCONTINUED | OUTPATIENT
Start: 2024-12-10 | End: 2024-12-12

## 2024-12-10 RX ORDER — PROGESTERONE 100 MG/1
100 CAPSULE ORAL NIGHTLY
Status: DISCONTINUED | OUTPATIENT
Start: 2024-12-10 | End: 2024-12-12

## 2024-12-10 RX ORDER — DIPHENHYDRAMINE HYDROCHLORIDE 50 MG/ML
25 INJECTION INTRAMUSCULAR; INTRAVENOUS ONCE
Status: COMPLETED | OUTPATIENT
Start: 2024-12-10 | End: 2024-12-10

## 2024-12-10 RX ORDER — METOCLOPRAMIDE HYDROCHLORIDE 5 MG/ML
5 INJECTION INTRAMUSCULAR; INTRAVENOUS ONCE
Status: DISCONTINUED | OUTPATIENT
Start: 2024-12-10 | End: 2024-12-11

## 2024-12-10 RX ORDER — PANTOPRAZOLE SODIUM 40 MG/1
40 TABLET, DELAYED RELEASE ORAL
Status: DISCONTINUED | OUTPATIENT
Start: 2024-12-11 | End: 2024-12-11

## 2024-12-10 RX ORDER — DIPHENHYDRAMINE HYDROCHLORIDE 50 MG/ML
12.5 INJECTION INTRAMUSCULAR; INTRAVENOUS EVERY 4 HOURS PRN
Status: DISCONTINUED | OUTPATIENT
Start: 2024-12-10 | End: 2024-12-12

## 2024-12-10 RX ORDER — MORPHINE SULFATE 2 MG/ML
1 INJECTION, SOLUTION INTRAMUSCULAR; INTRAVENOUS EVERY 2 HOUR PRN
Status: DISCONTINUED | OUTPATIENT
Start: 2024-12-10 | End: 2024-12-12

## 2024-12-10 RX ORDER — SODIUM CHLORIDE 9 MG/ML
INJECTION, SOLUTION INTRAVENOUS CONTINUOUS
Status: DISCONTINUED | OUTPATIENT
Start: 2024-12-10 | End: 2024-12-11

## 2024-12-10 RX ORDER — SODIUM CHLORIDE 9 MG/ML
INJECTION, SOLUTION INTRAVENOUS CONTINUOUS
Status: DISCONTINUED | OUTPATIENT
Start: 2024-12-10 | End: 2024-12-12

## 2024-12-10 RX ORDER — TEMAZEPAM 15 MG/1
15 CAPSULE ORAL NIGHTLY PRN
Status: DISCONTINUED | OUTPATIENT
Start: 2024-12-10 | End: 2024-12-11

## 2024-12-10 RX ORDER — MORPHINE SULFATE 4 MG/ML
4 INJECTION, SOLUTION INTRAMUSCULAR; INTRAVENOUS EVERY 2 HOUR PRN
Status: DISCONTINUED | OUTPATIENT
Start: 2024-12-10 | End: 2024-12-12

## 2024-12-10 RX ORDER — FAMOTIDINE 10 MG/ML
20 INJECTION, SOLUTION INTRAVENOUS ONCE
Status: COMPLETED | OUTPATIENT
Start: 2024-12-10 | End: 2024-12-10

## 2024-12-10 RX ORDER — DIPHENHYDRAMINE HYDROCHLORIDE 50 MG/ML
12.5 INJECTION INTRAMUSCULAR; INTRAVENOUS EVERY 4 HOURS PRN
Status: CANCELLED | OUTPATIENT
Start: 2024-12-10

## 2024-12-10 RX ORDER — HYDROXYZINE HYDROCHLORIDE 25 MG/1
25 TABLET, FILM COATED ORAL 3 TIMES DAILY PRN
Status: DISCONTINUED | OUTPATIENT
Start: 2024-12-10 | End: 2024-12-11

## 2024-12-10 RX ORDER — ONDANSETRON 2 MG/ML
4 INJECTION INTRAMUSCULAR; INTRAVENOUS EVERY 6 HOURS PRN
Status: DISCONTINUED | OUTPATIENT
Start: 2024-12-10 | End: 2024-12-11

## 2024-12-10 RX ORDER — DIPHENHYDRAMINE HCL 25 MG
25 CAPSULE ORAL EVERY 4 HOURS PRN
Status: CANCELLED | OUTPATIENT
Start: 2024-12-10

## 2024-12-10 RX ORDER — PROCHLORPERAZINE EDISYLATE 5 MG/ML
5 INJECTION INTRAMUSCULAR; INTRAVENOUS EVERY 8 HOURS PRN
Status: DISCONTINUED | OUTPATIENT
Start: 2024-12-10 | End: 2024-12-10

## 2024-12-10 RX ORDER — LORAZEPAM 2 MG/ML
1 INJECTION INTRAMUSCULAR ONCE
Status: DISCONTINUED | OUTPATIENT
Start: 2024-12-10 | End: 2024-12-11

## 2024-12-10 RX ORDER — ACETAMINOPHEN 500 MG
500 TABLET ORAL EVERY 4 HOURS PRN
Status: DISCONTINUED | OUTPATIENT
Start: 2024-12-10 | End: 2024-12-12

## 2024-12-10 RX ORDER — METOCLOPRAMIDE HYDROCHLORIDE 5 MG/ML
10 INJECTION INTRAMUSCULAR; INTRAVENOUS EVERY 6 HOURS PRN
Status: DISCONTINUED | OUTPATIENT
Start: 2024-12-10 | End: 2024-12-11

## 2024-12-10 RX ORDER — POTASSIUM CHLORIDE 14.9 MG/ML
20 INJECTION INTRAVENOUS ONCE
Status: COMPLETED | OUTPATIENT
Start: 2024-12-11 | End: 2024-12-11

## 2024-12-10 RX ORDER — DIAZEPAM 2 MG/1
6 TABLET ORAL EVERY EVENING
Status: DISCONTINUED | OUTPATIENT
Start: 2024-12-10 | End: 2024-12-11

## 2024-12-10 RX ORDER — DIPHENHYDRAMINE HCL 25 MG
25 CAPSULE ORAL EVERY 4 HOURS PRN
Status: DISCONTINUED | OUTPATIENT
Start: 2024-12-10 | End: 2024-12-12

## 2024-12-10 NOTE — ED INITIAL ASSESSMENT (HPI)
Pt here with N/V/D since Friday, unable to keep anything down since Friday. Was seen at [place] ED yesterday and given IV zofran and fluids and dc with zofran odt and has not worked at home.

## 2024-12-11 ENCOUNTER — APPOINTMENT (OUTPATIENT)
Dept: CT IMAGING | Facility: HOSPITAL | Age: 44
End: 2024-12-11
Attending: FAMILY MEDICINE
Payer: COMMERCIAL

## 2024-12-11 ENCOUNTER — APPOINTMENT (OUTPATIENT)
Dept: GENERAL RADIOLOGY | Facility: HOSPITAL | Age: 44
End: 2024-12-11
Attending: FAMILY MEDICINE
Payer: COMMERCIAL

## 2024-12-11 VITALS
HEIGHT: 65 IN | RESPIRATION RATE: 18 BRPM | BODY MASS INDEX: 24.16 KG/M2 | DIASTOLIC BLOOD PRESSURE: 76 MMHG | OXYGEN SATURATION: 99 % | HEART RATE: 72 BPM | WEIGHT: 145 LBS | TEMPERATURE: 99 F | SYSTOLIC BLOOD PRESSURE: 120 MMHG

## 2024-12-11 PROBLEM — F41.1 GENERALIZED ANXIETY DISORDER: Status: ACTIVE | Noted: 2024-12-11

## 2024-12-11 PROBLEM — F41.0 PANIC DISORDER WITHOUT AGORAPHOBIA: Status: ACTIVE | Noted: 2024-12-11

## 2024-12-11 PROBLEM — F13.20 BENZODIAZEPINE DEPENDENCE (HCC): Chronic | Status: ACTIVE | Noted: 2024-12-11

## 2024-12-11 LAB
ADENOVIRUS PCR:: NOT DETECTED
ALBUMIN SERPL-MCNC: 3.4 G/DL (ref 3.2–4.8)
ALBUMIN/GLOB SERPL: 1.3 {RATIO} (ref 1–2)
ALP LIVER SERPL-CCNC: 66 U/L
ALT SERPL-CCNC: 55 U/L
ANION GAP SERPL CALC-SCNC: 11 MMOL/L (ref 0–18)
AST SERPL-CCNC: 30 U/L (ref ?–34)
B PARAPERT DNA SPEC QL NAA+PROBE: NOT DETECTED
B PERT DNA SPEC QL NAA+PROBE: NOT DETECTED
BASOPHILS # BLD AUTO: 0.06 X10(3) UL (ref 0–0.2)
BASOPHILS NFR BLD AUTO: 0.5 %
BILIRUB SERPL-MCNC: 0.4 MG/DL (ref 0.3–1.2)
BUN BLD-MCNC: <5 MG/DL (ref 9–23)
C PNEUM DNA SPEC QL NAA+PROBE: NOT DETECTED
CALCIUM BLD-MCNC: 8.2 MG/DL (ref 8.7–10.4)
CHLORIDE SERPL-SCNC: 111 MMOL/L (ref 98–112)
CO2 SERPL-SCNC: 18 MMOL/L (ref 21–32)
CORONAVIRUS 229E PCR:: NOT DETECTED
CORONAVIRUS HKU1 PCR:: NOT DETECTED
CORONAVIRUS NL63 PCR:: NOT DETECTED
CORONAVIRUS OC43 PCR:: NOT DETECTED
CREAT BLD-MCNC: 0.59 MG/DL
EGFRCR SERPLBLD CKD-EPI 2021: 115 ML/MIN/1.73M2 (ref 60–?)
EOSINOPHIL # BLD AUTO: 0.14 X10(3) UL (ref 0–0.7)
EOSINOPHIL NFR BLD AUTO: 1.2 %
ERYTHROCYTE [DISTWIDTH] IN BLOOD BY AUTOMATED COUNT: 12.7 %
FLUAV RNA SPEC QL NAA+PROBE: NOT DETECTED
FLUBV RNA SPEC QL NAA+PROBE: NOT DETECTED
GLOBULIN PLAS-MCNC: 2.7 G/DL (ref 2–3.5)
GLUCOSE BLD-MCNC: 85 MG/DL (ref 70–99)
GLUCOSE BLD-MCNC: 92 MG/DL (ref 70–99)
HCT VFR BLD AUTO: 35.4 %
HGB BLD-MCNC: 12.2 G/DL
IMM GRANULOCYTES # BLD AUTO: 0.06 X10(3) UL (ref 0–1)
IMM GRANULOCYTES NFR BLD: 0.5 %
LYMPHOCYTES # BLD AUTO: 2.49 X10(3) UL (ref 1–4)
LYMPHOCYTES NFR BLD AUTO: 22 %
MCH RBC QN AUTO: 30.4 PG (ref 26–34)
MCHC RBC AUTO-ENTMCNC: 34.5 G/DL (ref 31–37)
MCV RBC AUTO: 88.3 FL
METAPNEUMOVIRUS PCR:: NOT DETECTED
MONOCYTES # BLD AUTO: 0.98 X10(3) UL (ref 0.1–1)
MONOCYTES NFR BLD AUTO: 8.7 %
MYCOPLASMA PNEUMONIA PCR:: NOT DETECTED
NEUTROPHILS # BLD AUTO: 7.57 X10 (3) UL (ref 1.5–7.7)
NEUTROPHILS # BLD AUTO: 7.57 X10(3) UL (ref 1.5–7.7)
NEUTROPHILS NFR BLD AUTO: 67.1 %
PARAINFLUENZA 1 PCR:: NOT DETECTED
PARAINFLUENZA 2 PCR:: NOT DETECTED
PARAINFLUENZA 3 PCR:: NOT DETECTED
PARAINFLUENZA 4 PCR:: NOT DETECTED
PLATELET # BLD AUTO: 374 10(3)UL (ref 150–450)
POTASSIUM SERPL-SCNC: 3.8 MMOL/L (ref 3.5–5.1)
POTASSIUM SERPL-SCNC: 3.8 MMOL/L (ref 3.5–5.1)
PROT SERPL-MCNC: 6.1 G/DL (ref 5.7–8.2)
RBC # BLD AUTO: 4.01 X10(6)UL
RHINOVIRUS/ENTERO PCR:: NOT DETECTED
RSV RNA SPEC QL NAA+PROBE: NOT DETECTED
SARS-COV-2 RNA NPH QL NAA+NON-PROBE: NOT DETECTED
SODIUM SERPL-SCNC: 140 MMOL/L (ref 136–145)
WBC # BLD AUTO: 11.3 X10(3) UL (ref 4–11)

## 2024-12-11 PROCEDURE — 90792 PSYCH DIAG EVAL W/MED SRVCS: CPT | Performed by: OTHER

## 2024-12-11 PROCEDURE — 74176 CT ABD & PELVIS W/O CONTRAST: CPT | Performed by: FAMILY MEDICINE

## 2024-12-11 PROCEDURE — 74018 RADEX ABDOMEN 1 VIEW: CPT | Performed by: FAMILY MEDICINE

## 2024-12-11 RX ORDER — ONDANSETRON 2 MG/ML
8 INJECTION INTRAMUSCULAR; INTRAVENOUS EVERY 6 HOURS PRN
Status: DISCONTINUED | OUTPATIENT
Start: 2024-12-11 | End: 2024-12-12

## 2024-12-11 RX ORDER — LORAZEPAM 2 MG/ML
1 INJECTION INTRAMUSCULAR EVERY 4 HOURS PRN
Status: DISCONTINUED | OUTPATIENT
Start: 2024-12-11 | End: 2024-12-11

## 2024-12-11 RX ORDER — PROCHLORPERAZINE EDISYLATE 5 MG/ML
10 INJECTION INTRAMUSCULAR; INTRAVENOUS EVERY 6 HOURS PRN
Status: DISCONTINUED | OUTPATIENT
Start: 2024-12-11 | End: 2024-12-12

## 2024-12-11 RX ORDER — HYDROXYZINE HYDROCHLORIDE 50 MG/ML
50 INJECTION, SOLUTION INTRAMUSCULAR EVERY 6 HOURS PRN
Status: DISCONTINUED | OUTPATIENT
Start: 2024-12-11 | End: 2024-12-12

## 2024-12-11 RX ORDER — KETOROLAC TROMETHAMINE 15 MG/ML
15 INJECTION, SOLUTION INTRAMUSCULAR; INTRAVENOUS EVERY 6 HOURS PRN
Status: DISCONTINUED | OUTPATIENT
Start: 2024-12-11 | End: 2024-12-12

## 2024-12-11 RX ORDER — DIAZEPAM 10 MG/2ML
6 INJECTION, SOLUTION INTRAMUSCULAR; INTRAVENOUS 2 TIMES DAILY
Status: DISCONTINUED | OUTPATIENT
Start: 2024-12-11 | End: 2024-12-11

## 2024-12-11 RX ORDER — POTASSIUM CHLORIDE 14.9 MG/ML
20 INJECTION INTRAVENOUS ONCE
Status: COMPLETED | OUTPATIENT
Start: 2024-12-11 | End: 2024-12-11

## 2024-12-11 RX ORDER — KETOROLAC TROMETHAMINE 30 MG/ML
30 INJECTION, SOLUTION INTRAMUSCULAR; INTRAVENOUS EVERY 6 HOURS PRN
Status: CANCELLED | OUTPATIENT
Start: 2024-12-11 | End: 2024-12-13

## 2024-12-11 RX ORDER — KETOROLAC TROMETHAMINE 15 MG/ML
15 INJECTION, SOLUTION INTRAMUSCULAR; INTRAVENOUS EVERY 6 HOURS PRN
Status: CANCELLED | OUTPATIENT
Start: 2024-12-11 | End: 2024-12-13

## 2024-12-11 RX ORDER — DIAZEPAM 10 MG/2ML
6 INJECTION, SOLUTION INTRAMUSCULAR; INTRAVENOUS EVERY 6 HOURS PRN
Status: DISCONTINUED | OUTPATIENT
Start: 2024-12-11 | End: 2024-12-11

## 2024-12-11 RX ORDER — DIAZEPAM 10 MG/2ML
6 INJECTION, SOLUTION INTRAMUSCULAR; INTRAVENOUS NIGHTLY
Status: DISCONTINUED | OUTPATIENT
Start: 2024-12-11 | End: 2024-12-12

## 2024-12-11 NOTE — ED QUICK NOTES
This nurse took over. A medication was ordered and not given to the patient. Patient was not hooked up to the monitor and vitals had not been taken since triage. Empty fluid bag was hanging and still connected to the patient.

## 2024-12-11 NOTE — ED PROVIDER NOTES
Patient Seen in: [facility] Emergency Department      History     Chief Complaint   Patient presents with    Nausea/Vomiting/Diarrhea     Stated Complaint: N/V/D since friday with 2 ED visits since then. Not able to keep anything down *    Subjective:   HPI      This is a 44 year-old female who presents with nausea, vomiting ,diarrhea since Friday.  In ED visit for same complaint on Friday.  She states she still unable to keep anything down.  She was initially evaluated the [facility] emergency room on Friday a and states that she has not felt any better.  States Zofran is not helping.  She states she has not eat a normal meal since Friday.  3 out of 4 kids are sick with similar symptoms but they have gotten better and she has not.  She continues to have overwhelming nausea and is not sleeping.  She vomited once today.  She had multiple squirts of diarrhea mucousy brown.  No fevers.  No recent travel.  She does not recall anything unusual.  History of C. difficile.  She presents with her  for further evaluation.              Objective:     Past Medical History:    Acid reflux disease    ADHD    Allergic rhinitis    Anxiety    Attention deficit hyperactivity disorder (ADHD)    Bartholin cyst    Constipation    Esophageal reflux    Hx of motion sickness    Migraine    Migraines              Past Surgical History:   Procedure Laterality Date    Abdominoplasty      Colonoscopy  2021          Other surgical history      Rhinoplasty    Reconstr nose+altaf septal repair      Upper gi endoscopy,diagnosis      essentially normal per pt, reflux                Social History     Socioeconomic History    Marital status:    Tobacco Use    Smoking status: Former     Current packs/day: 0.00     Average packs/day: 1 pack/day for 17.0 years (17.0 ttl pk-yrs)     Types: Cigarettes     Start date: 2001     Quit date: 2018     Years since quittin.6    Smokeless tobacco: Former    Tobacco  comments:     vaping   Vaping Use    Vaping status: Former    Substances: Nicotine, Flavoring    Devices: Refillable tank   Substance and Sexual Activity    Alcohol use: No    Drug use: No    Sexual activity: Yes     Partners: Male     Birth control/protection: Vasectomy   Other Topics Concern    Caffeine Concern No    Exercise Yes     Comment: Exercise intolerance due to meds    Seat Belt No    Special Diet Yes     Comment: Many dietary restrictions    Stress Concern No    Weight Concern No     Social Drivers of Health     Financial Resource Strain: Low Risk  (11/18/2024)    Received from [facility]    Overall Financial Resource Strain (CARDIA)     Difficulty of Paying Living Expenses: Not hard at all   Food Insecurity: No Food Insecurity (11/18/2024)    Received from [facility]    Hunger Vital Sign     Worried About Running Out of Food in the Last Year: Never true     Ran Out of Food in the Last Year: Never true   Transportation Needs: No Transportation Needs (11/18/2024)    Received from [facility]    PRAPARE - Transportation     Lack of Transportation (Medical): No     Lack of Transportation (Non-Medical): No   Housing Stability: Low Risk  (4/1/2024)    Received from [facility], [facility]    Housing Stability Vital Sign     Unable to Pay for Housing in the Last Year: No     Number of Places Lived in the Last Year: 2     Unstable Housing in the Last Year: No                  Physical Exam     ED Triage Vitals [12/10/24 1636]   /86   Pulse 84   Resp 18   Temp 97.7 °F (36.5 °C)   Temp src Temporal   SpO2 98 %   O2 Device        Current Vitals:   Vital Signs  BP: 129/86  Pulse: 84  Resp: 18  Temp: 97.7 °F (36.5 °C)  Temp src: Temporal    Oxygen Therapy  SpO2: 98 %        Physical Exam  GENERAL: Awake, alert oriented x3, nontoxic appearing.   SKIN: Normal, warm, and dry.  HEENT:  Pupils equally round  and reactive to light. Conjuctiva clear.  Oropharynx is clear and moist.   Lungs: Clear to auscultation bilaterally with no rales, no retractions, and no wheezing.  HEART:  Regular rate and rhythm. S1 and S2. No murmurs, no rubs or gallops.   ABDOMEN: Soft, nontender and nondistended. Normoactive bowel sounds. No rebound. No guarding.   EXTREMITIES: Warm with brisk capillary refill.         ED Course     Labs Reviewed   CBC WITH DIFFERENTIAL WITH PLATELET - Abnormal; Notable for the following components:       Result Value    WBC 13.2 (*)     Neutrophil Absolute Prelim 10.32 (*)     Neutrophil Absolute 10.32 (*)     All other components within normal limits   COMP METABOLIC PANEL (14) - Abnormal; Notable for the following components:    CO2 20.0 (*)     BUN <5 (*)     ALT 69 (*)     All other components within normal limits   LIPASE - Normal   RAINBOW DRAW LAVENDER   RAINBOW DRAW LIGHT GREEN   GI STOOL PANEL BY PCR   C. DIFFICILE(TOXIGENIC)PCR                   MDM      This is a 44 year-old female who presents with nausea, vomiting ,diarrhea since Friday.  In ED visit for same complaint on Friday.  She states she still unable to keep anything down.  She was initially evaluated the [place] emergency room on Friday a and states that she has not felt any better.  States Zofran is not helping.  She states she has not eat a normal meal since Friday.  3 out of 4 kids are sick with similar symptoms but they have gotten better and she has not.  She continues to have overwhelming nausea and is not sleeping.  Differential includes gastroenteritis, C. difficile, viral diarrhea.      IV line was established of normal saline.  Patient was given a 1 L bolus.  IV Zofran, Pepcid initially given.  She was still nauseated Reglan and Benadryl was added.  She refused the Reglan but was agreeable to Benadryl.  She states that it did help the nausea but now it is coming back.      She states she was addicted to Ativan because they  had prescribed that for her when she was having GI issues and they thought it was anxiety.  It turned out she had C. difficile.  But now she has to be weaned off the Ativan and is currently using Valium through her primary care physician      Basic labs were obtained.  CBC: White blood cell count 13.2.  Hemoglobin 13.6.  Platelet 433.  CMP: BUN 5.  Creatinine 0.7.  Glucose 88.  Bicarb 20.  Lipase 37.  ALT minimally elevated at 69.  Previously was 83.      Patient unable to provide stool sample at this time but stool panel is ordered and pending.    Patient will be better for intractable vomiting and metabolic acidosis.  She will admitted under Dr. Marino her primary care physician.  Patient aware  Plan of admission expresses understanding.      Case discussed with Dr. Marino    Disposition and Plan     Clinical Impression:  1. Intractable vomiting    2. Metabolic acidosis         Disposition:  Admit  12/10/2024  8:19 pm    Follow-up:  No follow-up provider specified.        Medications Prescribed:  Current Discharge Medication List              Supplementary Documentation:         Hospital Problems       Present on Admission  Date Reviewed: 11/29/2024            ICD-10-CM Noted POA    * (Principal) Intractable vomiting R11.10 12/10/2024 Unknown

## 2024-12-11 NOTE — BH PROGRESS NOTE
Received a call from Pt's DAY RN regarding Pt's benzodiazepines that have been offered to Pt for anxiety. Instructed that I am the LSW and cannot prescribe or monitor medications - that would be psychiatry team. Spoke to ON RN who stated that the physician is requesting the psych liaison to see Pt to see if psychiatry needs to be involved. Writer spoke with Pt who does not appear in distress and Pt denies she is having \"a mental health crisis, more like a medical crisis... worried.\" Pt states she has a psychiatrist who specializes in tapering down medications and  she is being tapered down on valium and states she is experiencing withdrawal symptoms and the valium has \"no effect.\" Pt states she is \"not entirely\" anxious and is \"not anxious all the time.\" She also states she has \"not run into this before\" regarding her medical situation. Pt requesting to be seen by the hospital psychiatrist. Pt is guarded.

## 2024-12-11 NOTE — PROGRESS NOTES
Alert & oriented x4.With tolerable pain.Anxious at times . With nausea on and off and some dizziness,with poor appetite.Tolerated ice chips and sips of water.Up in room .Ambulation in hallway encouraged.Plan  of care discussed with patient and her .All questions and concerns addressed. aware of consult.

## 2024-12-11 NOTE — PROGRESS NOTES
NURSING ADMISSION NOTE      Patient admitted via Cart  Oriented to room.  Safety precautions initiated.  Bed in low position.  Call light in reach.    Pt refused two-person skin check.  .9@100 started while waiting for orders

## 2024-12-11 NOTE — DIETARY NOTE
[facility]   part of [facility]   CLINICAL NUTRITION    Fortunato Johnson     Admitting diagnosis:  Metabolic acidosis [E87.20]  Intractable vomiting [R11.10]    Ht: 165.1 cm (5' 5\")  Wt: 65.8 kg (145 lb).   Body mass index is 24.13 kg/m².  IBW: 56.8kg    Wt Readings from Last 6 Encounters:   12/11/24 65.8 kg (145 lb)   12/09/24 64.9 kg (143 lb)   12/07/24 66.7 kg (147 lb)   11/29/24 69.9 kg (154 lb)   11/15/24 69.9 kg (154 lb)   01/12/23 59 kg (130 lb)        Labs/Meds reviewed    Diet:       Procedures    Clear liquid diet Is Patient on Accuchecks? No     Percent Meals Eaten (last 3 days)       Date/Time Percent Meals Eaten (%)    12/10/24 2208 0 %    12/11/24 0339 0 %            Pt chart reviewed d/t +MST. 44-year-old female who presented 12/10 due to nausea, vomiting, and diarrhea since Friday. Waiting for clears to arrive. Nursing notes reports Percent Meals Eaten (%): 0 % intake for last meal. Down 9# ((5.8%) x ~1 week. RD to follow for advancement    Patient is at low nutrition risk at this time.    Please consult if patient status changes or nutrition issues arise.    Oanh Alexander RD, LDN  Clinical Nutrition  [phone]

## 2024-12-11 NOTE — CONSULTS
[facility]  Report of Psychiatric Consultation    Fortunato Johnson Patient Status:  Inpatient    1980 MRN [id number]   Location [facility] 3NW-A Attending Manny Marino MD   Hosp Day # 1 PCP Manny Marino MD     Date of Admission: 12/10/2024  Date of Consult: 2024   Reason for Consultation: Second opinion     Impression: Patient is a 44-year-old female who presented to the ED yesterday due to nausea, vomiting, and diarrhea.     Primary Psychiatric Diagnosis:  Generalized anxiety disorder     Panic disorder     Personality Traits: Deferred      Pertinent Medical Diagnoses: GERD, migraines, hx of C.Diff     Recommendations:  Medication changes as ordered by Dr. Wells:   Initiate hydroxyzine 50 mg IM Q6 PRN anxiety   Change Valium to 6 mg IV nightly   Discontinue hydroxyzine 25 mg PO TID PRN  Discontinue Ativan 1 mg IV Q4 PRN   Discontinue Restoril 15 mg PO nightly PRN     Patient is not in need of inpatient psychiatric care. Encouraged patient to continue to follow with her outpatient psychiatrist and therapist upon discharge.     Will follow please call with questions    ASHANTI Kaufman, [facility]     History of Present Illness:  Patient is a 44-year-old female who presented to the ED yesterday due to nausea, vomiting, and diarrhea since Friday. She reports that her and her children became ill with a stomach bug last week and has noted heightened anxiety since, therefore psychiatry is consulted today.     At the time of visit this morning, patient is seen at bedside with her  who provides additional history.  reports that approximately 3 years ago, patient was having significant issues with diarrhea which she was seen for several times. The diarrhea was attributed to panic/anxiety and patient was given prescriptions for PO Ativan. After several months, it was determined that patient actually had C.Diff and was put on vancomycin. Patient confirms these events  and states that she has been seeing Dr. Lawrence Hunt to work on weaning her off of the benzodiazepines for the last 3 years. She reports that Dr. Hunt transitioned her from Ativan 5 mg to Valium 50 mg and currently has her on 6 mg PO Valium once daily.  further elaborates that patient had hemorrhoid surgery on 11/15 and was put on prophylactic antibiotics post-op. Reports that patient did not wake up from anesthesia well and has had a \"dramatic increase\" in anxiety ever since. Patient states that ever since Friday when her symptoms of nausea, vomiting, and diarrhea started, her panic has gotten much worse. She feels as though she may be going through withdrawals and describes her symptoms as nausea, vomiting, dizziness, headaches, and muscle aches.     Patient describes her mood as \"not great\". Does confirm feeling helpless but denies feeling hopeless or worthless. Appetite and sleep have both been very minimal, maybe 1-2 hours per night. She reports she feels \"wired\" due to the anxiety. Panic attacks have occurred at least several times per day characterized by feelings of impending doom and wanting to jump out of her skin. Denies suicidal ideations. No symptoms of yasir or psychosis.     Patient seen again this afternoon with Dr. Wells. She again endorses significant anxiety with frequent panic attacks worsened by her current physical illness. Reports that she does not feel like she would need treatment for her anxiety other than continuing to wean off of benzodiazepines entirely with her outpatient psychiatrist. Endorses anxiety as a teenager that improved through cognitive behavioral therapy. She is agreeable to maintaining her current dose of 6 mg Valium nightly while in the hospital. This will be ordered IV due to her current nausea and vomiting. She is also agreeable to discontinuing the IV Ativan and instead initiating IM hydroxyzine PRN. While this will hopefully help her breakthrough anxiety, it  may assist in reducing nausea and vomiting as well.     Past Psychiatric History: No history of suicide attempts or self-injuring behavior. No history of inpatient psychiatric hospitalizations. Sees her psychiatrist, Dr. Lawrence Hunt, once per month as he is currently working to wean her off of the Valium. Follows with a therapist through [facility] once per week. Denies taking any psychiatric medications other than the Valium.     Substance Use History: Denies.     Psych Family History: Denies.     Social and Developmental History: [place]. She has been  to her  for 7 years. They have 4 children, ranging in age from 10-18. Not currently working. She is an only child, parents do not live in the area. Denies legal issues. Denies history of trauma.     Past Medical History:    Acid reflux disease    ADHD    Allergic rhinitis    Anxiety    Attention deficit hyperactivity disorder (ADHD)    Bartholin cyst    Constipation    Esophageal reflux    Hx of motion sickness    Migraine    Migraines     Past Surgical History:   Procedure Laterality Date    Abdominoplasty      Colonoscopy  2021          Other surgical history      Rhinoplasty    Reconstr nose+altaf septal repair      Upper gi endoscopy,diagnosis      essentially normal per pt, reflux     Family History   Problem Relation Age of Onset    Anemia Mother     Diabetes Paternal Grandfather       reports that she quit smoking about 6 years ago. Her smoking use included cigarettes. She started smoking about 23 years ago. She has a 17 pack-year smoking history. She has quit using smokeless tobacco. She reports that she does not drink alcohol and does not use drugs.    Allergies:  Allergies[1]    Medications:    Current Facility-Administered Medications:     ketorolac (Toradol) 15 MG/ML injection 15 mg, 15 mg, Intravenous, Q6H PRN    potassium chloride 20 mEq/100mL IVPB premix 20 mEq, 20 mEq, Intravenous, Once    diazepam  (Valium) 5 mg/mL injection 6 mg, 6 mg, Intravenous, BID    LORazepam (Ativan) 2 mg/mL injection 1 mg, 1 mg, Intravenous, Q4H PRN    ondansetron (Zofran) 4 MG/2ML injection 8 mg, 8 mg, Intravenous, Q6H PRN    metoclopramide (Reglan) 5 mg/mL injection 5 mg, 5 mg, Intravenous, Once    diphenhydrAMINE (Benadryl) 50 mg/mL  injection 12.5 mg, 12.5 mg, Intravenous, Q4H PRN **OR** diphenhydrAMINE (Benadryl) cap/tab 25 mg, 25 mg, Oral, Q4H PRN    metoclopramide (Reglan) 5 mg/mL injection 10 mg, 10 mg, Intravenous, Q6H PRN    hydrOXYzine (Atarax) tab 25 mg, 25 mg, Oral, TID PRN    melatonin tab 3 mg, 3 mg, Oral, Nightly    SUMAtriptan (Imitrex) tab 50 mg, 50 mg, Oral, Q2H PRN    progesterone (Prometrium) cap 100 mg, 100 mg, Oral, Nightly    sodium chloride 0.9% infusion, , Intravenous, Continuous    acetaminophen (Tylenol Extra Strength) tab 500 mg, 500 mg, Oral, Q4H PRN    morphINE PF 2 MG/ML injection 1 mg, 1 mg, Intravenous, Q2H PRN **OR** morphINE PF 2 MG/ML injection 2 mg, 2 mg, Intravenous, Q2H PRN **OR** morphINE PF 4 MG/ML injection 4 mg, 4 mg, Intravenous, Q2H PRN    temazepam (Restoril) cap 15 mg, 15 mg, Oral, Nightly PRN    pantoprazole (Protonix) 40 mg in sodium chloride 0.9% PF 10 mL IV push, 40 mg, Intravenous, QAM AC **OR** [DISCONTINUED] pantoprazole (Protonix) DR tab 40 mg, 40 mg, Oral, QAM AC    Review of Systems   Constitutional: Negative.    HENT: Negative.     Eyes: Negative.    Respiratory: Negative.     Cardiovascular: Negative.    Gastrointestinal:  Positive for diarrhea, nausea and vomiting. Negative for abdominal pain, blood in stool, constipation, heartburn and melena.   Genitourinary: Negative.    Musculoskeletal: Negative.    Skin: Negative.    Neurological: Negative.    Endo/Heme/Allergies: Negative.    Psychiatric/Behavioral:  Negative for depression, hallucinations, memory loss, substance abuse and suicidal ideas. The patient is nervous/anxious and has insomnia.      Psychiatry Review Systems:  No voices or visions. No elevated mood, racing thoughts, decreased need for sleep, grandiose thoughts, increased participation in risky behaviors, or history of trauma.     Mental Status Exam:     Risk Assessment  Suicidal ideation: no suicidal ideation  Homicidal ideation: None noted    Appearance: unremarkable  Behavior: unremarkable  Attitude: cooperative and consistent  Gait: Not observed     Speech: normal rate, rhythm and volume    Mood: sad and anxious  Affect: Congruent    Thought process: logical and sequential  Thought content: no delusions, obsessions, or other thought abnormalities  Perceptions: no hallucinations  Associations: Intact    Orientation: Alert and oriented x 4  Attention and Concentration:   good  Memory:  intact immediate, recent, remote  Language: Intact naming and repetition  Fund of Knowledge: Able to recite name of [place] president    Insight: good   Judgment: good     Objective    Vitals:    12/11/24 0902   BP: 114/78   Pulse: 75   Resp: 18   Temp: 98.2 °F (36.8 °C)     Patient Strengths/Assets: Kind      Suicide Risk Assessments:  Overall level of suicide risk is low      Risk Factors: anxiety      Environmental Factors: good relationship with outpatient psychiatric providers     Protective Factors: , children     Laboratory Data:  Lab Results   Component Value Date    WBC 11.3 12/11/2024    HGB 12.2 12/11/2024    HCT 35.4 12/11/2024    .0 12/11/2024    CREATSERUM 0.59 12/11/2024    BUN <5 12/11/2024     12/11/2024    K 3.8 12/11/2024    K 3.8 12/11/2024     12/11/2024    CO2 18.0 12/11/2024    GLU 92 12/11/2024    CA 8.2 12/11/2024    ALB 3.4 12/11/2024    ALKPHO 66 12/11/2024    BILT 0.4 12/11/2024    TP 6.1 12/11/2024    AST 30 12/11/2024    ALT 55 12/11/2024    LIP 37 12/10/2024    PGLU 85 12/11/2024       STUDIES:    ASHANTI Kaufman, PMHNP-BC  12/11/2024  10:33 AM         [1]   Allergies  Allergen Reactions    Amoxicillin HIVES    Penicillins HIVES and  NAUSEA AND VOMITING

## 2024-12-11 NOTE — PLAN OF CARE
Alert and oriented x4. Distressed psychosocial state increasing overnight    Dr. Marino paged overnight regarding multiple patient requests: Valium request to change to IV, pain management with request for Toradol for generalized muscle aches, concerns regarding distressed state, request for one time Ativan IV, and requests for clarification regarding plan of care    Normotensive, afebrile. EKG performed per order at shift change reveals NSR.  Patient denies chest pain, denies lightheadedness.    On telemetry, normal sinus rhythm with occasional sinus tach, see media.    Bowel sounds present  Endorses nausea  No emesis overnight  No bowel movement overnight    Isolation precautions in place, patient and spouse educated regarding handwashing importance  IV fluids infusing

## 2024-12-11 NOTE — ED QUICK NOTES
Orders for admission, patient is aware of plan and ready to go upstairs. Any questions, please call ED RN Joy at extension [phone].     Patient Covid vaccination status: Fully vaccinated     COVID Test Ordered in ED: None    COVID Suspicion at Admission: N/A    Running Infusions:  None    Mental Status/LOC at time of transport: a/o x4    Other pertinent information:   CIWA score: N/A   NIH score:  N/A

## 2024-12-11 NOTE — CONSULTS
[facility]   part of [facility]    Report of Gastroenterology Consultation    Fortunato Johnson Patient Status:  Inpatient    1980 MRN [id number]   Location [facility] 3NW-A Attending Manny Marino MD   Hosp Day # 1 PCP Manny Marino MD     Date of Admission:  12/10/2024  Date of Consult:   2024    Reason for Consultation:    vomiting    History of Present Illness:  Fortunato Johnson is a a(n) 44 year old female.     5 days of vomiting, nausea.  Kids were sick with viral illness.  Patient does not typically have nausea.  Unable to eat. Has been to ER for hydration only to have recurrent vomiting    Denies abdominal pan, fevers, chills    Non-contrast CT negative    IV zofran not helping. Takes Valium at home.  IV ativan helped here but limited use per Psychiatry         History:  Past Medical History:    Acid reflux disease    ADHD    Allergic rhinitis    Anxiety    Attention deficit hyperactivity disorder (ADHD)    Bartholin cyst    Constipation    Esophageal reflux    Hx of motion sickness    Migraine    Migraines     Past Surgical History:   Procedure Laterality Date    Abdominoplasty      Colonoscopy  2021          Other surgical history      Rhinoplasty    Reconstr nose+altaf septal repair      Upper gi endoscopy,diagnosis      essentially normal per pt, reflux     Family History   Problem Relation Age of Onset    Anemia Mother     Diabetes Paternal Grandfather       reports that she quit smoking about 6 years ago. Her smoking use included cigarettes. She started smoking about 23 years ago. She has a 17 pack-year smoking history. She has quit using smokeless tobacco. She reports that she does not drink alcohol and does not use drugs.    Allergies:  Allergies[1]    Medications:    Current Facility-Administered Medications:     ketorolac (Toradol) 15 MG/ML injection 15 mg, 15 mg, Intravenous, Q6H PRN    ondansetron (Zofran) 4 MG/2ML injection 8 mg, 8 mg,  Intravenous, Q6H PRN    diazepam (Valium) 5 mg/mL injection 6 mg, 6 mg, Intravenous, Nightly    hydrOXYzine 50 mg/mL injection 50 mg, 50 mg, Intramuscular, Q6H PRN    prochlorperazine (Compazine) 10 MG/2ML injection 10 mg, 10 mg, Intravenous, Q6H PRN    diphenhydrAMINE (Benadryl) 50 mg/mL  injection 12.5 mg, 12.5 mg, Intravenous, Q4H PRN **OR** diphenhydrAMINE (Benadryl) cap/tab 25 mg, 25 mg, Oral, Q4H PRN    melatonin tab 3 mg, 3 mg, Oral, Nightly    SUMAtriptan (Imitrex) tab 50 mg, 50 mg, Oral, Q2H PRN    progesterone (Prometrium) cap 100 mg, 100 mg, Oral, Nightly    sodium chloride 0.9% infusion, , Intravenous, Continuous    acetaminophen (Tylenol Extra Strength) tab 500 mg, 500 mg, Oral, Q4H PRN    morphINE PF 2 MG/ML injection 1 mg, 1 mg, Intravenous, Q2H PRN **OR** morphINE PF 2 MG/ML injection 2 mg, 2 mg, Intravenous, Q2H PRN **OR** morphINE PF 4 MG/ML injection 4 mg, 4 mg, Intravenous, Q2H PRN    pantoprazole (Protonix) 40 mg in sodium chloride 0.9% PF 10 mL IV push, 40 mg, Intravenous, QAM AC **OR** [DISCONTINUED] pantoprazole (Protonix) DR tab 40 mg, 40 mg, Oral, QAM AC    Review of Systems:  Gastrointestinal: Denies positive test for blood stool, heartburn/indigestion/reflux, belching, difficulty swallowing, irregular bowel habits, painful swallowing, diarrhea, abdominal pain, constipation, nausea, incontinence of stool, vomiting, black stools, get full quickly at meals, blood in stools, abdominal distention, jaundice, flatulence, vomiting blood, bloating, hernia, laxative use, food/milk intolerance, pain with bowel movement, hemorrhoids.  General: Denies fatigue, chills/fever, night sweats, weight loss, loss of appetite, weight gain, sleep disturbance.  Neurological: Denies frequent headaches, history of stroke, recent passing out, recent dizziness, convulsions/seizures, dementia.  Cardiovascular: Denies history of heart murmur, leg swelling, history of rheumatic fever, pacemaker, chest pain or pressure  after eating or when upset, automatic defibrillator, angina, other implanted devices, irregular heart rate/palpitations, high cholesterol or triglycerides, coronary stents.  Respiratory: Denies shortness of breath, chronic/frequent hoarseness, wheezing, exposure to tuberculosis, chronic cough, spitting up blood, cough up sputum, sleep apnea.  Genitourinary: Denies kidney stones, painful/difficult urination, frequent urinary infections, frequent urination, blood in urine, incontinence, kidney failure, prostate problems.  Endocrine: Denies thyroid disease, denies diabetes.  Female complaints: Denies endometriosis, painful menstrual periods, heavy menstrual periods.  Patient is not pregnant.  Psychosocial: Denies history of mental illness, denies usually feeling lonely or depressed, denies history of depression, anxiety, history of physical or sexual abuse, stress, history of eating disorder.  Skin: Denies severe itching, unusual moles, rash, flushing, change in hair or nails.  Bone/joint: Denies arthritis, back pain, joint pain, osteoporosis.  Heme/Lymphatic: Denies easy bruising, anemia, excessive bleeding, enlarging or painful lymph nodes.  Allergy: Denies medication allergy, latex/rubber allergy, anaphylactic or other reaction to anesthesia, food allergy.   Eyes: Denies blurred/double vision, eye disease, glasses or contacts, glaucoma.  ENT: Denies nose or gums bleeding, mouth sores, bad breath or bad taste in mouth, hearing loss.  Physical Exam:    Blood pressure 121/78, pulse 68, temperature 98.3 °F (36.8 °C), temperature source Oral, resp. rate 18, height 5' 5\" (1.651 m), weight 145 lb (65.8 kg), last menstrual period 11/16/2024, SpO2 96%, not currently breastfeeding.    General: Appears alert, oriented x3 and in no acute distress.  HEENT: Normal. No neck vein distention. Thyroid not enlarged.  No lymphadenopathy.  CV: S1 and S2 normal.  No murmurs or gallops.  Lungs: Clear to auscultation.  Abdomen: Soft and  nondistended.  Nontender.  No masses.  Bowel sounds are present.  Back: No CVA tenderness.  Extremities: No edema, cyanosis, or clubbing.  Skin: Warm    Laboratory Data:  Lab Results   Component Value Date    WBC 11.3 12/11/2024    HGB 12.2 12/11/2024    HCT 35.4 12/11/2024    .0 12/11/2024    CREATSERUM 0.59 12/11/2024    BUN <5 12/11/2024     12/11/2024    K 3.8 12/11/2024    K 3.8 12/11/2024     12/11/2024    CO2 18.0 12/11/2024    GLU 92 12/11/2024    CA 8.2 12/11/2024    ALB 3.4 12/11/2024    ALKPHO 66 12/11/2024    BILT 0.4 12/11/2024    TP 6.1 12/11/2024    AST 30 12/11/2024    ALT 55 12/11/2024    LIP 37 12/10/2024       Imaging:      Assessment/Plan:    Viral induced N/V/D.  Remote hx of CDI.   Stool still need to be collected to check for infectious etiology    Hydroxyzine being tried today.  Zofran not helping.  Compazine PRN q6h    Full liquids    Sign off at this point.  Please call us with questions.  Thank you very much for allowing us to participate in the care of your patient.      Patient Active Problem List   Diagnosis    Chronic migraine    Attention deficit hyperactivity disorder (ADHD), predominantly inattentive type    Neck pain    Upper back pain    Vitamin D deficiency    Gastroesophageal reflux disease without esophagitis    Anxiety    Lesion of uvula    Oropharyngeal dysphagia    Anal fissure    Internal hemorrhoids with complication    Internal hemorrhoids    Skin tag of perianal region    Intractable vomiting    Metabolic acidosis    Panic disorder without agoraphobia    Generalized anxiety disorder              Feliberto Lemons MD  12/11/2024  3:10 PM         [1]   Allergies  Allergen Reactions    Amoxicillin HIVES    Penicillins HIVES and NAUSEA AND VOMITING

## 2024-12-11 NOTE — ED QUICK NOTES
Patient states she is currently tapering from Valium [facility].

## 2024-12-12 LAB
ATRIAL RATE: 68 BPM
P AXIS: 70 DEGREES
P-R INTERVAL: 158 MS
Q-T INTERVAL: 416 MS
QRS DURATION: 74 MS
QTC CALCULATION (BEZET): 442 MS
R AXIS: 64 DEGREES
T AXIS: 51 DEGREES
VENTRICULAR RATE: 68 BPM

## 2024-12-12 NOTE — PROGRESS NOTES
Patient tolerating food and ambulation after and hour with no nausea or diarrhea. Dr Marino notified of patient wishes to discharge. Patient and  given discharge education. Patient alert x 4 and in agreement with plan to discharge. Patient transported from unit via wheelchair and into private vehicle. All belongings with patient.

## 2024-12-12 NOTE — H&P
[facility]  History & Physical    Fortunato Johnson Patient Status:  Inpatient    1980 MRN [id number]   Location [facility] 3NW-A Attending Manny Marino MD   Hosp Day # 1 PCP Manny Marino MD     History of Present Illness:  Fortunato Johnson is a(n) 44 year old female admitted for intractable vomiting and diarrhea.  Symptoms began 5 days ago and has been unable to eat or drink .  Her children all had gatroenteritis viral.  They have all resolved ..  Has been to ER a few days ago and symptoms have not improved .      History:  Past Medical History:    Acid reflux disease    ADHD    Allergic rhinitis    Anxiety    Attention deficit hyperactivity disorder (ADHD)    Bartholin cyst    Constipation    Esophageal reflux    Hx of motion sickness    Migraine    Migraines     Past Surgical History:   Procedure Laterality Date    Abdominoplasty      Colonoscopy  2021          Other surgical history      Rhinoplasty    Reconstr nose+altaf septal repair      Upper gi endoscopy,diagnosis      essentially normal per pt, reflux     Family History   Problem Relation Age of Onset    Anemia Mother     Diabetes Paternal Grandfather       reports that she quit smoking about 6 years ago. Her smoking use included cigarettes. She started smoking about 23 years ago. She has a 17 pack-year smoking history. She has quit using smokeless tobacco. She reports that she does not drink alcohol and does not use drugs.    Allergies:  Allergies[1]    Home Medications:  Prescriptions Prior to Admission[2]    Review of Systems:  Pertinent items are noted in HPI.    Physical Exam:  /76 (BP Location: Left arm)   Pulse 72   Temp 98.7 °F (37.1 °C) (Oral)   Resp 18   Ht 5' 5\" (1.651 m)   Wt 145 lb (65.8 kg)   LMP 2024 (Exact Date)   SpO2 99%   BMI 24.13 kg/m²     General Appearance:    Alert, cooperative, no distress, appears stated age   Head:    Normocephalic, without obvious abnormality,  atraumatic   Eyes:    PERRL, conjunctiva/corneas clear, EOM's intact, fundi     benign, both eyes   Ears:    Normal TM's and external ear canals, both ears   Nose:   Nares normal, septum midline, mucosa normal, no drainage    or sinus tenderness   Throat:   Lips, mucosa, and tongue normal; teeth and gums normal   Neck:   Supple, symmetrical, trachea midline, no adenopathy;     thyroid:  no enlargement/tenderness/nodules; no carotid    bruit or JVD   Back:     Symmetric, no curvature, ROM normal, no CVA tenderness   Lungs:     Clear to auscultation bilaterally, respirations unlabored   Chest Wall:    No tenderness or deformity    Heart:    Regular rate and rhythm, S1 and S2 normal, no murmur, rub   or gallop   Breast Exam:    No tenderness, masses, or nipple abnormality   Abdomen:     Soft, non-tender, bowel sounds active all four quadrants,     no masses, no organomegaly   Genitalia:    Normal female without lesion, discharge or tenderness   Rectal:    Normal tone, no masses or tenderness;    guaiac negative stool   Extremities:   Extremities normal, atraumatic, no cyanosis or edema   Pulses:   2+ and symmetric all extremities   Skin:   Skin color, texture, turgor normal, no rashes or lesions   Lymph nodes:   Cervical, supraclavicular, and axillary nodes normal   Neurologic:   CNII-XII intact, normal strength, sensation and reflexes     throughout       Laboratory Data:   Lab Results   Component Value Date    WBC 11.3 12/11/2024    HGB 12.2 12/11/2024    HCT 35.4 12/11/2024    .0 12/11/2024    CREATSERUM 0.59 12/11/2024    BUN <5 12/11/2024     12/11/2024    K 3.8 12/11/2024    K 3.8 12/11/2024     12/11/2024    CO2 18.0 12/11/2024    GLU 92 12/11/2024    CA 8.2 12/11/2024    ALB 3.4 12/11/2024    ALKPHO 66 12/11/2024    BILT 0.4 12/11/2024    TP 6.1 12/11/2024    AST 30 12/11/2024    ALT 55 12/11/2024    PGLU 85 12/11/2024       Imaging:  CT Scan    Assessment:  Patient Active Problem List    Diagnosis    Chronic migraine    Attention deficit hyperactivity disorder (ADHD), predominantly inattentive type    Neck pain    Upper back pain    Vitamin D deficiency    Gastroesophageal reflux disease without esophagitis    Anxiety    Lesion of uvula    Oropharyngeal dysphagia    Anal fissure    Internal hemorrhoids with complication    Internal hemorrhoids    Skin tag of perianal region    Intractable vomiting    Metabolic acidosis    Panic disorder without agoraphobia    Generalized anxiety disorder    Benzodiazepine dependence (HCC)           Plan:  1  intractable vomiting and / diarrhea / viral gastro enteritis   Reglan / zofran / benedryl /   Iv fluids   Clears diet   Consult GI      2  right lower quadrant pain   Toradol   Ct neg   Kub neg   Resolved with hydration     3  benzo dependence   Consult psych   Valium 6 mg iv bid  Ativan 1 mg prn     4  anxiety   Sees psychiatry  Declines new meds   Cont out patient psych       Addendum   Improving   Tolerating diet   No vomiting   No diarrhea  Want to go home   Patient will dc home       Manny Marino MD  12/11/2024  8:40 PM         [1]   Allergies  Allergen Reactions    Amoxicillin HIVES    Penicillins HIVES and NAUSEA AND VOMITING   [2]   Medications Prior to Admission   Medication Sig Dispense Refill Last Dose/Taking    melatonin 3 MG Oral Tab Take 1 tablet (3 mg total) by mouth nightly.   12/9/2024    SUMAtriptan Succinate 100 MG Oral Tab TAKE 1 TABLET BY MOUTH AT ONSET OF MIGRAINE. CAN REPEAT IN 2 HOURS IF NEEDED. MAX 3 TIMES A WEEK   Past Month    progesterone 100 MG Oral Cap Take 1 capsule (100 mg total) by mouth nightly.   12/9/2024    hydrOXYzine 25 MG Oral Tab Take 1 tablet (25 mg total) by mouth 3 (three) times daily as needed for Itching.   Past Week    diazePAM 10 MG Oral Tab Take 6 mg by mouth every evening.   12/9/2024    ubrogepant (UBRELVY) 100 MG Oral Tab TAKE 1 TABLET BY MOUTH AT THE ONSET OF THE MIGRAINE AND CAN REPEAT IN 2 HOURS IF  NEEDED 30 tablet 0 Past Month    AJOVY 225 MG/1.5ML Subcutaneous Solution Prefilled Syringe INJECT 225 MG INTO THE SKIN EVERY 30 (THIRTY) DAYS. 1.5 mL 11 Past Month    ondansetron 4 MG Oral Tablet Dispersible Take 1 tablet (4 mg total) by mouth every 4 (four) hours as needed for Nausea. 10 tablet 0     ondansetron 4 MG Oral Tablet Dispersible Take 1 tablet (4 mg total) by mouth every 6 (six) hours as needed for Nausea. 15 tablet 0     OnabotulinumtoxinA (BOTOX) 200 units Injection Recon Soln Inject 155 units to face and neck every 12 weeks 200 Units 3

## 2024-12-13 NOTE — PAYOR COMM NOTE
--------------  ADMISSION REVIEW     Payor: [employer] P[employer]  Subscriber #:  [id number]  Authorization Number: [id number]    Admit date: 12/10/24  Admit time:  9:16 PM       REVIEW DOCUMENTATION:     ED Provider Notes          Stated Complaint: N/V/D since friday with 2 ED visits since then. Not able to keep anything down *    Subjective:   HPI      This is a 44 year-old female who presents with nausea, vomiting ,diarrhea since Friday.  In ED visit for same complaint on Friday.  She states she still unable to keep anything down.  She was initially evaluated the [place] emergency room on Friday a and states that she has not felt any better.  States Zofran is not helping.  She states she has not eat a normal meal since Friday.  3 out of 4 kids are sick with similar symptoms but they have gotten better and she has not.  She continues to have overwhelming nausea and is not sleeping.  She vomited once today.  She had multiple squirts of diarrhea mucousy brown.  No fevers.  No recent travel.  She does not recall anything unusual.  History of C. difficile.  She presents with her  for further evaluation.      Physical Exam     ED Triage Vitals [12/10/24 1636]   /86   Pulse 84   Resp 18   Temp 97.7 °F (36.5 °C)   Temp src Temporal   SpO2 98 %   O2 Device        Current Vitals:   Vital Signs  BP: 129/86  Pulse: 84  Resp: 18  Temp: 97.7 °F (36.5 °C)  Temp src: Temporal    Oxygen Therapy  SpO2: 98 %        Physical Exam  GENERAL: Awake, alert oriented x3, nontoxic appearing.   SKIN: Normal, warm, and dry.  HEENT:  Pupils equally round and reactive to light. Conjuctiva clear.  Oropharynx is clear and moist.   Lungs: Clear to auscultation bilaterally with no rales, no retractions, and no wheezing.  HEART:  Regular rate and rhythm. S1 and S2. No murmurs, no rubs or gallops.   ABDOMEN: Soft, nontender and nondistended. Normoactive bowel sounds. No rebound. No guarding.   EXTREMITIES: Warm with brisk  capillary refill.         ED Course     Labs Reviewed   CBC WITH DIFFERENTIAL WITH PLATELET - Abnormal; Notable for the following components:       Result Value    WBC 13.2 (*)     Neutrophil Absolute Prelim 10.32 (*)     Neutrophil Absolute 10.32 (*)     All other components within normal limits   COMP METABOLIC PANEL (14) - Abnormal; Notable for the following components:    CO2 20.0 (*)     BUN <5 (*)     ALT 69 (*)     All other components within normal limits       MDM      This is a 44 year-old female who presents with nausea, vomiting ,diarrhea since Friday.  In ED visit for same complaint on Friday.  She states she still unable to keep anything down.  She was initially evaluated the [facility] emergency room on Friday a and states that she has not felt any better.  States Zofran is not helping.  She states she has not eat a normal meal since Friday.  3 out of 4 kids are sick with similar symptoms but they have gotten better and she has not.  She continues to have overwhelming nausea and is not sleeping.  Differential includes gastroenteritis, C. difficile, viral diarrhea.      IV line was established of normal saline.  Patient was given a 1 L bolus.  IV Zofran, Pepcid initially given.  She was still nauseated Reglan and Benadryl was added.  She refused the Reglan but was agreeable to Benadryl.  She states that it did help the nausea but now it is coming back.      She states she was addicted to Ativan because they had prescribed that for her when she was having GI issues and they thought it was anxiety.  It turned out she had C. difficile.  But now she has to be weaned off the Ativan and is currently using Valium through her primary care physician      Basic labs were obtained.  CBC: White blood cell count 13.2.  Hemoglobin 13.6.  Platelet 433.  CMP: BUN 5.  Creatinine 0.7.  Glucose 88.  Bicarb 20.  Lipase 37.  ALT minimally elevated at 69.  Previously was 83.      Patient unable to provide stool sample at  this time but stool panel is ordered and pending.    Patient will be better for intractable vomiting and metabolic acidosis.  She will admitted under Dr. Marino her primary care physician.  Patient aware  Plan of admission expresses understanding.      Case discussed with Dr. Marino    Disposition and Plan     Clinical Impression:  1. Intractable vomiting    2. Metabolic acidosis         Disposition:  Admit  12/10/2024  8:19 pm        History & Physical           Fortunato Johnson Patient Status:  Inpatient    1980 MRN [id number]   [facility] 3NW-A Attending Manny Marino MD   Hosp Day # 1 PCP Manny Marino MD      History of Present Illness:  Fortunato Johnson is a(n) 44 year old female admitted for intractable vomiting and diarrhea.  Symptoms began 5 days ago and has been unable to eat or drink .  Her children all had gatroenteritis viral.  They have all resolved ..  Has been to ER a few days ago and symptoms have not improved .        Plan:  1  intractable vomiting and / diarrhea / viral gastro enteritis   Reglan / zofran / benedryl /   Iv fluids   Clears diet   Consult GI        2  right lower quadrant pain   Toradol   Ct neg   Kub neg   Resolved with hydration      3  benzo dependence   Consult psych   Valium 6 mg iv bid  Ativan 1 mg prn      4  anxiety   Sees psychiatry  Declines new meds   Cont out patient psych         Addendum   Improving   Tolerating diet   No vomiting   No diarrhea  Want to go home   Patient will dc home         Manny Marino MD     GI:    Date of Admission:  12/10/2024  Date of Consult:   2024     Reason for Consultation:     vomiting     History of Present Illness:  Fortunato Johnson is a a(n) 44 year old female.      5 days of vomiting, nausea.  Kids were sick with viral illness.  Patient does not typically have nausea.  Unable to eat. Has been to ER for hydration only to have recurrent vomiting     Denies abdominal pan,  fevers, chills     Non-contrast CT negative     IV zofran not helping. Takes Valium at home.  IV ativan helped here but limited use per Psychiatry     Review of Systems:  Gastrointestinal: Denies positive test for blood stool, heartburn/indigestion/reflux, belching, difficulty swallowing, irregular bowel habits, painful swallowing, diarrhea, abdominal pain, constipation, nausea, incontinence of stool, vomiting, black stools, get full quickly at meals, blood in stools, abdominal distention, jaundice, flatulence, vomiting blood, bloating, hernia, laxative use, food/milk intolerance, pain with bowel movement, hemorrhoids.  General: Denies fatigue, chills/fever, night sweats, weight loss, loss of appetite, weight gain, sleep disturbance.  Neurological: Denies frequent headaches, history of stroke, recent passing out, recent dizziness, convulsions/seizures, dementia.  Cardiovascular: Denies history of heart murmur, leg swelling, history of rheumatic fever, pacemaker, chest pain or pressure after eating or when upset, automatic defibrillator, angina, other implanted devices, irregular heart rate/palpitations, high cholesterol or triglycerides, coronary stents.  Respiratory: Denies shortness of breath, chronic/frequent hoarseness, wheezing, exposure to tuberculosis, chronic cough, spitting up blood, cough up sputum, sleep apnea.  Genitourinary: Denies kidney stones, painful/difficult urination, frequent urinary infections, frequent urination, blood in urine, incontinence, kidney failure, prostate problems.  Endocrine: Denies thyroid disease, denies diabetes.  Female complaints: Denies endometriosis, painful menstrual periods, heavy menstrual periods.  Patient is not pregnant.  Psychosocial: Denies history of mental illness, denies usually feeling lonely or depressed, denies history of depression, anxiety, history of physical or sexual abuse, stress, history of eating disorder.  Skin: Denies severe itching, unusual moles,  rash, flushing, change in hair or nails.  Bone/joint: Denies arthritis, back pain, joint pain, osteoporosis.  Heme/Lymphatic: Denies easy bruising, anemia, excessive bleeding, enlarging or painful lymph nodes.  Allergy: Denies medication allergy, latex/rubber allergy, anaphylactic or other reaction to anesthesia, food allergy.   Eyes: Denies blurred/double vision, eye disease, glasses or contacts, glaucoma.  ENT: Denies nose or gums bleeding, mouth sores, bad breath or bad taste in mouth, hearing loss.  Physical Exam:    Blood pressure 121/78, pulse 68, temperature 98.3 °F (36.8 °C), temperature source Oral, resp. rate 18, height 5' 5\" (1.651 m), weight 145 lb (65.8 kg), last menstrual period 11/16/2024, SpO2 96%, not currently breastfeeding.     General: Appears alert, oriented x3 and in no acute distress.  HEENT: Normal. No neck vein distention. Thyroid not enlarged.  No lymphadenopathy.  CV: S1 and S2 normal.  No murmurs or gallops.  Lungs: Clear to auscultation.  Abdomen: Soft and nondistended.  Nontender.  No masses.  Bowel sounds are present.  Back: No CVA tenderness.  Extremities: No edema, cyanosis, or clubbing.  Skin: Warm     Laboratory Data:        Lab Results   Component Value Date     WBC 11.3 12/11/2024     HGB 12.2 12/11/2024     HCT 35.4 12/11/2024     .0 12/11/2024     CREATSERUM 0.59 12/11/2024     BUN <5 12/11/2024      12/11/2024     K 3.8 12/11/2024     K 3.8 12/11/2024      12/11/2024     CO2 18.0 12/11/2024     GLU 92 12/11/2024     CA 8.2 12/11/2024     ALB 3.4 12/11/2024     ALKPHO 66 12/11/2024     BILT 0.4 12/11/2024     TP 6.1 12/11/2024     AST 30 12/11/2024     ALT 55 12/11/2024     LIP 37 12/10/2024         Imaging:        Assessment/Plan:     Viral induced N/V/D.  Remote hx of CDI.   Stool still need to be collected to check for infectious etiology     Hydroxyzine being tried today.  Zofran not helping.  Compazine PRN q6h     Full liquids     Sign off at this point.   Please call us with questions.  Thank you very much for allowing us to participate in the care of your patient.              Feliberto Lemons MD  12/11/2024  3:10 PM                         Date/Time Temp Pulse Resp BP SpO2 Weight O2 Device O2 Flow Rate (L/min) [facility]    12/11/24 1632 98.7 °F (37.1 °C) 72 18 120/76 99 % -- None (Room air) --     12/11/24 1228 98.3 °F (36.8 °C) 68 18 121/78 96 % -- None (Room air) --     12/11/24 0902 98.2 °F (36.8 °C) 75 18 114/78 96 % -- None (Room air) --     12/11/24 0339 98 °F (36.7 °C) 63 16 126/79 98 % -- None (Room air) -- MQ    12/11/24 0213 -- -- -- -- -- 145 lb (65.8 kg) -- -- MV    12/10/24 2125 98.2 °F (36.8 °C) 73 20 126/83 95 % -- None (Room air) --     12/10/24 2045 -- 72 22 119/88 100 % -- None (Room air) --     12/10/24 2015 -- 69 14 114/83 100 % -- None (Room air) --     12/10/24 1636 97.7 °F (36.5 °C) 84 18 129/86 98 % 145 lb (65.8 kg) -- -- BR

## 2024-12-13 NOTE — PAYOR COMM NOTE
Discharge Notification    Patient Name: MARISELA VICTOR  Payor: [employer] PPO  Subscriber #: [id number]  Authorization Number: [id number]  Admit Date/Time: 12/10/2024 5:58 PM  Discharge Date/Time: 12/11/2024 11:46 PM

## 2024-12-16 NOTE — PAYOR COMM NOTE
--------------  DISCHARGE REVIEW    Payor: [employer] STATE PPO  Subscriber #:  [id number]  Authorization Number: [id number]    Admit date: N/A  Admit time:  N/A  Discharge Date: 12/11/2024 11:46 PM     Admitting Physician: Manny Marino MD  Attending Physician:  No att. providers found  Primary Care Physician: Manny Marino MD           REVIEWER COMMENTS      Will accept observation

## 2024-12-22 ENCOUNTER — HOSPITAL ENCOUNTER (EMERGENCY)
Age: 44
Discharge: LEFT BEFORE DISCHARGE | End: 2024-12-23
Attending: EMERGENCY MEDICINE
Payer: COMMERCIAL

## 2024-12-22 DIAGNOSIS — R74.8 ELEVATED LIVER ENZYMES: ICD-10-CM

## 2024-12-22 DIAGNOSIS — R51.9 NONINTRACTABLE HEADACHE, UNSPECIFIED CHRONICITY PATTERN, UNSPECIFIED HEADACHE TYPE: ICD-10-CM

## 2024-12-22 DIAGNOSIS — R11.0 NAUSEA: Primary | ICD-10-CM

## 2024-12-22 LAB
ALBUMIN SERPL-MCNC: 4.8 G/DL (ref 3.2–4.8)
ALBUMIN/GLOB SERPL: 1.8 {RATIO} (ref 1–2)
ALP LIVER SERPL-CCNC: 88 U/L
ALT SERPL-CCNC: 120 U/L
ANION GAP SERPL CALC-SCNC: 9 MMOL/L (ref 0–18)
AST SERPL-CCNC: 74 U/L (ref ?–34)
BASOPHILS # BLD AUTO: 0.07 X10(3) UL (ref 0–0.2)
BASOPHILS NFR BLD AUTO: 0.5 %
BILIRUB SERPL-MCNC: 0.5 MG/DL (ref 0.3–1.2)
BUN BLD-MCNC: 7 MG/DL (ref 9–23)
CALCIUM BLD-MCNC: 9.8 MG/DL (ref 8.7–10.4)
CHLORIDE SERPL-SCNC: 106 MMOL/L (ref 98–112)
CO2 SERPL-SCNC: 20 MMOL/L (ref 21–32)
CREAT BLD-MCNC: 0.69 MG/DL
EGFRCR SERPLBLD CKD-EPI 2021: 110 ML/MIN/1.73M2 (ref 60–?)
EOSINOPHIL # BLD AUTO: 0.04 X10(3) UL (ref 0–0.7)
EOSINOPHIL NFR BLD AUTO: 0.3 %
ERYTHROCYTE [DISTWIDTH] IN BLOOD BY AUTOMATED COUNT: 12.5 %
GLOBULIN PLAS-MCNC: 2.6 G/DL (ref 2–3.5)
GLUCOSE BLD-MCNC: 94 MG/DL (ref 70–99)
HCT VFR BLD AUTO: 43.6 %
HGB BLD-MCNC: 15.6 G/DL
IMM GRANULOCYTES # BLD AUTO: 0.05 X10(3) UL (ref 0–1)
IMM GRANULOCYTES NFR BLD: 0.4 %
LIPASE SERPL-CCNC: 62 U/L (ref 12–53)
LYMPHOCYTES # BLD AUTO: 1.93 X10(3) UL (ref 1–4)
LYMPHOCYTES NFR BLD AUTO: 13.6 %
MCH RBC QN AUTO: 31.2 PG (ref 26–34)
MCHC RBC AUTO-ENTMCNC: 35.8 G/DL (ref 31–37)
MCV RBC AUTO: 87.2 FL
MONOCYTES # BLD AUTO: 1.09 X10(3) UL (ref 0.1–1)
MONOCYTES NFR BLD AUTO: 7.7 %
NEUTROPHILS # BLD AUTO: 11.06 X10 (3) UL (ref 1.5–7.7)
NEUTROPHILS # BLD AUTO: 11.06 X10(3) UL (ref 1.5–7.7)
NEUTROPHILS NFR BLD AUTO: 77.5 %
OSMOLALITY SERPL CALC.SUM OF ELEC: 278 MOSM/KG (ref 275–295)
PLATELET # BLD AUTO: 376 10(3)UL (ref 150–450)
POTASSIUM SERPL-SCNC: 3.8 MMOL/L (ref 3.5–5.1)
PROT SERPL-MCNC: 7.4 G/DL (ref 5.7–8.2)
RBC # BLD AUTO: 5 X10(6)UL
SODIUM SERPL-SCNC: 135 MMOL/L (ref 136–145)
WBC # BLD AUTO: 14.2 X10(3) UL (ref 4–11)

## 2024-12-22 PROCEDURE — 80053 COMPREHEN METABOLIC PANEL: CPT | Performed by: EMERGENCY MEDICINE

## 2024-12-22 PROCEDURE — 99284 EMERGENCY DEPT VISIT MOD MDM: CPT

## 2024-12-22 PROCEDURE — 83690 ASSAY OF LIPASE: CPT | Performed by: EMERGENCY MEDICINE

## 2024-12-22 PROCEDURE — 96361 HYDRATE IV INFUSION ADD-ON: CPT

## 2024-12-22 PROCEDURE — 85025 COMPLETE CBC W/AUTO DIFF WBC: CPT | Performed by: EMERGENCY MEDICINE

## 2024-12-22 PROCEDURE — 96374 THER/PROPH/DIAG INJ IV PUSH: CPT

## 2024-12-22 RX ORDER — ONDANSETRON 2 MG/ML
4 INJECTION INTRAMUSCULAR; INTRAVENOUS ONCE
Status: COMPLETED | OUTPATIENT
Start: 2024-12-22 | End: 2024-12-22

## 2024-12-23 ENCOUNTER — APPOINTMENT (OUTPATIENT)
Dept: CT IMAGING | Age: 44
End: 2024-12-23
Attending: EMERGENCY MEDICINE
Payer: COMMERCIAL

## 2024-12-23 VITALS
TEMPERATURE: 98 F | HEART RATE: 72 BPM | RESPIRATION RATE: 16 BRPM | BODY MASS INDEX: 23.32 KG/M2 | DIASTOLIC BLOOD PRESSURE: 79 MMHG | SYSTOLIC BLOOD PRESSURE: 111 MMHG | HEIGHT: 65 IN | OXYGEN SATURATION: 98 % | WEIGHT: 140 LBS

## 2024-12-23 LAB
AMPHET UR QL SCN: NEGATIVE
B-HCG UR QL: NEGATIVE
BILIRUB UR QL CFM: NEGATIVE
CANNABINOIDS UR QL SCN: NEGATIVE
CLARITY UR REFRACT.AUTO: CLEAR
COCAINE UR QL: NEGATIVE
COLOR UR AUTO: YELLOW
CREAT UR-SCNC: 122.6 MG/DL
FENTANYL UR QL SCN: NEGATIVE
GLUCOSE UR STRIP.AUTO-MCNC: NEGATIVE MG/DL
KETONES UR STRIP.AUTO-MCNC: 80 MG/DL
LEUKOCYTE ESTERASE UR QL STRIP.AUTO: NEGATIVE
MDMA UR QL SCN: NEGATIVE
NITRITE UR QL STRIP.AUTO: NEGATIVE
OPIATES UR QL SCN: NEGATIVE
PH UR STRIP.AUTO: 5.5 [PH] (ref 5–8)
PROT UR STRIP.AUTO-MCNC: NEGATIVE MG/DL
SP GR UR STRIP.AUTO: 1.02 (ref 1–1.03)
UROBILINOGEN UR STRIP.AUTO-MCNC: 0.2 MG/DL

## 2024-12-23 PROCEDURE — 96375 TX/PRO/DX INJ NEW DRUG ADDON: CPT

## 2024-12-23 PROCEDURE — 80307 DRUG TEST PRSMV CHEM ANLYZR: CPT | Performed by: EMERGENCY MEDICINE

## 2024-12-23 PROCEDURE — 81001 URINALYSIS AUTO W/SCOPE: CPT | Performed by: EMERGENCY MEDICINE

## 2024-12-23 PROCEDURE — 70450 CT HEAD/BRAIN W/O DYE: CPT | Performed by: EMERGENCY MEDICINE

## 2024-12-23 PROCEDURE — 81025 URINE PREGNANCY TEST: CPT

## 2024-12-23 PROCEDURE — 81015 MICROSCOPIC EXAM OF URINE: CPT | Performed by: EMERGENCY MEDICINE

## 2024-12-23 RX ORDER — HALOPERIDOL 5 MG/ML
5 INJECTION INTRAMUSCULAR ONCE
Status: DISCONTINUED | OUTPATIENT
Start: 2024-12-23 | End: 2024-12-23

## 2024-12-23 RX ORDER — METOCLOPRAMIDE HYDROCHLORIDE 5 MG/ML
10 INJECTION INTRAMUSCULAR; INTRAVENOUS ONCE
Status: DISCONTINUED | OUTPATIENT
Start: 2024-12-23 | End: 2024-12-23

## 2024-12-23 RX ORDER — DIPHENHYDRAMINE HYDROCHLORIDE 50 MG/ML
25 INJECTION INTRAMUSCULAR; INTRAVENOUS ONCE
Status: COMPLETED | OUTPATIENT
Start: 2024-12-23 | End: 2024-12-23

## 2024-12-23 NOTE — ED PROVIDER NOTES
Patient Seen in: [facility] In [place]      History     Chief Complaint   Patient presents with    Nausea/Vomiting/Diarrhea     Stated Complaint: Day 17 of intense nausea    Subjective:   HPI      This is a 44-year-old woman here for evaluation persistent nausea.  Patient recently admitted for similar symptoms, seen and evaluated by GI, no clear etiology of her symptoms discomfort, did have an unremarkable CT scan of the abdomen pelvis 2 weeks ago however this was a noncontrast scan.  States her symptoms did improve for a few days after recent hospitalization however returned did not seem just as bad as ever.  Does take diazepam 3 times daily has been able to tolerate this medication, though states essentially unable to eat or drink anything else.    Objective:     Past Medical History:    Acid reflux disease    ADHD    Allergic rhinitis    Anxiety    Attention deficit hyperactivity disorder (ADHD)    Bartholin cyst    Constipation    Esophageal reflux    Hx of motion sickness    Migraine    Migraines              Past Surgical History:   Procedure Laterality Date    Abdominoplasty      Colonoscopy  2021          Other surgical history      Rhinoplasty    Reconstr nose+altaf septal repair      Upper gi endoscopy,diagnosis      essentially normal per pt, reflux                Social History     Socioeconomic History    Marital status:    Tobacco Use    Smoking status: Former     Current packs/day: 0.00     Average packs/day: 1 pack/day for 17.0 years (17.0 ttl pk-yrs)     Types: Cigarettes     Start date: 2001     Quit date: 2018     Years since quittin.7    Smokeless tobacco: Former    Tobacco comments:     vaping   Vaping Use    Vaping status: Former    Substances: Nicotine, Flavoring    Devices: Refillable tank   Substance and Sexual Activity    Alcohol use: No    Drug use: No    Sexual activity: Yes     Partners: Male     Birth control/protection: Vasectomy    Other Topics Concern    Caffeine Concern No    Exercise Yes     Comment: Exercise intolerance due to meds    Seat Belt No    Special Diet Yes     Comment: Many dietary restrictions    Stress Concern No    Weight Concern No     Social Drivers of Health     Financial Resource Strain: Low Risk  (11/18/2024)    Received from [facility]    Overall Financial Resource Strain (CARDIA)     Difficulty of Paying Living Expenses: Not hard at all   Food Insecurity: No Food Insecurity (12/10/2024)    Food Insecurity     Food Insecurity: Never true   Transportation Needs: No Transportation Needs (12/10/2024)    Transportation Needs     Lack of Transportation: No   Housing Stability: Low Risk  (12/10/2024)    Housing Stability     Housing Instability: No                  Physical Exam     ED Triage Vitals [12/22/24 2227]   /80   Pulse 107   Resp 24   Temp 98.4 °F (36.9 °C)   Temp src Oral   SpO2 94 %   O2 Device None (Room air)       Current Vitals:   Vital Signs  BP: 111/79  Pulse: 72  Resp: 16  Temp: 98.4 °F (36.9 °C)  Temp src: Oral    Oxygen Therapy  SpO2: 98 %  O2 Device: None (Room air)        Physical Exam      Physical Exam  Vitals signs and nursing note reviewed.   General:   patient laying supine in bed appears very fatigued  Head: Normocephalic and atraumatic.   HEENT:  Mucous membranes are moist.   Cardiovascular:  Normal rate and regular rhythm.  No Edema  Pulmonary:  Pulmonary effort is normal.  Normal breath sounds. No wheezing, rhonchi or rales.   Abdominal: Soft nontender nondistended, normal bowel sounds, no guarding no rebound tenderness  Skin: Warm and dry  Neurological: Awake alert, speech is normal, motor 5/5 in bilateral upper and lower extremities.  sensation is grossly intact throughout.  No facial asymmetry.  Stable narrow based gait.        ED Course     Labs Reviewed   COMP METABOLIC PANEL (14) - Abnormal; Notable for the following components:       Result Value    Sodium  135 (*)     CO2 20.0 (*)     BUN 7 (*)     AST 74 (*)      (*)     All other components within normal limits   CBC WITH DIFFERENTIAL WITH PLATELET - Abnormal; Notable for the following components:    WBC 14.2 (*)     Neutrophil Absolute Prelim 11.06 (*)     Neutrophil Absolute 11.06 (*)     Monocyte Absolute 1.09 (*)     All other components within normal limits   URINALYSIS WITH CULTURE REFLEX - Abnormal; Notable for the following components:    Ketones Urine 80.0 (*)     Blood Urine Trace-lysed (*)     All other components within normal limits   LIPASE - Abnormal; Notable for the following components:    Lipase 62 (*)     All other components within normal limits   DRUG SCREEN 8 W/OUT CONFIRMATION, URINE - Abnormal; Notable for the following components:    Benzodiazepines Urine Presumed Positive (*)     All other components within normal limits    Narrative:     Results of the Urine Drug Screen should be used only for medical purposes.   UA MICROSCOPIC ONLY, URINE - Abnormal; Notable for the following components:    RBC Urine 3-5 (*)     Bacteria Urine 1+ (*)     Squamous Epi. Cells Few (*)     All other components within normal limits   ICTOTEST - Normal   POCT PREGNANCY URINE - Normal          CT BRAIN OR HEAD (CPT=70450)    Result Date: 12/23/2024  PROCEDURE:  CT BRAIN OR HEAD (33892)  COMPARISON:  None.  INDICATIONS:  headache  TECHNIQUE:  Noncontrast CT scanning is performed through the brain. Dose reduction techniques were used. Dose information is transmitted to the ACR (American College of Radiology) NRDR (National Radiology Data Registry) which includes the Dose Index Registry.  PATIENT STATED HISTORY: (As transcribed by Technologist)  Vomiting for the past 17 days. Recent admission to [facility] 12/10. States no diagnosis found. Sent home with Zofran which family states is not helping anymore.    FINDINGS:  The ventricles are normal in size and configuration. There is no evidence of hemorrhage, mass,  midline shift, or extra-axial fluid collection.  The visualized paranasal sinuses show no significant sinus disease . No evidence of depressed skull fracture.            CONCLUSION: No acute intracranial findings. Preliminary report was provided by [facility] at time of examination.  Final report confirms findings on preliminary report without discrepancy.   LOCATION:  [facility]   Dictated by (CST): Jerry Covington MD on 12/23/2024 at 5:29 AM     Finalized by (CST): Jerry Covingtno MD on 12/23/2024 at 5:37 AM       CT ABDOMEN+PELVIS(CPT=74176)    Result Date: 12/11/2024  PROCEDURE:  CT ABDOMEN+PELVIS (CPT=74176)  COMPARISON:  [facility], CT, CT ABDOMEN PELVIS IV CONTRAST, NO ORAL (ER), 1/12/2023, 8:54 PM.  INDICATIONS:  pain / vomiting  TECHNIQUE:  Unenhanced multislice CT scanning was performed from the dome of the diaphragm to the pubic symphysis.  Dose reduction techniques were used. Dose information is transmitted to the ACR (American College of Radiology) NRDR (National Radiology Data Registry) which includes the Dose Index Registry.  PATIENT STATED HISTORY: (As transcribed by Technologist)  RUQ,RLQ pain, nause and vomiting for 3 days.   FINDINGS:  KIDNEYS:  No hydronephrosis, nephrolithiasis or obstructing urinary calculus ADRENALS:  Normal.  No mass or enlargement.  LIVER:  Unremarkable.  BILIARY:  No biliary ductal dilatation.  PANCREAS:  Unremarkable.  SPLEEN:  Normal.  No enlargement or focal lesion.  AORTA/VASCULAR:  Normal.  No aneurysm.  RETROPERITONEUM:  No enlarged adenopathy.  BOWEL/MESENTERY:  Normal caliber appendix with 2 mm punctate appendicolith..  Moderate amount of stool in the colon..  BONES:  No lytic or destructive process. PELVIC ORGANS:  1.8 cm right ovarian cyst.  LUNG BASES:         Clear            CONCLUSION:  No nephrolithiasis, hydronephrosis or obstructing urinary calculus.  No CT evidence for appendicitis.  There is a normal caliber appendix with 2 mm appendicolith.   LOCATION:  [facility]   Dictated by (CST): Kristine Brian MD on 12/11/2024 at 11:03 AM     Finalized by (CST): Kristine Brian MD on 12/11/2024 at 11:06 AM       XR ABDOMEN (1 VIEW) (CPT=74018)    Result Date: 12/11/2024  PROCEDURE:  XR ABDOMEN (1 VIEW) (CPT=74018)  INDICATIONS:  nausea  COMPARISON:  [facility], XR, XR ABDOMEN OBSTRUCTIVE SERIES ROUTINE(2 VW)(CPT=74019), 12/19/2020, 0:21 AM.  TECHNIQUE:  Supine AP view was obtained.  PATIENT STATED HISTORY: (As transcribed by Technologist)  Patient states she has abdominal pain. Patient also shares she has nausea with vomiting.    FINDINGS:  BOWEL GAS PATTERN:  Normal.  No abnormal dilation or deviation.  CALCIFICATIONS:  None significant. OTHER:  Negative.  No abnormal gaseous collections.             CONCLUSION:  No acute radiographic findings.   LOCATION:  [facility]   Dictated by (CST): Juan Choudhury MD on 12/11/2024 at 10:29 AM     Finalized by (CST): Juan Choudhury MD on 12/11/2024 at 10:31 AM      .       MDM   This is a 44-year-old woman here for evaluation of persistent abdominal discomfort, nausea.  Recently mended for similar symptoms.  Differential includes bowel obstruction, nausea, electrolyte abnormalities, cyclic vomiting, transaminitis pancreatitis acute hepatitis.  Basic labs ordered and reviewed show mild elevation in liver enzymes from prior patient does report mild right upper quadrant discomfort.  She also reported that she was having intermittent headaches.  For this reason CT scan of the head was obtained shows no acute abnormalities.  Patient did except Zofran for nausea this did not help.  She has had multiple adverse reactions to other medications, she declined Reglan which she had an adverse reaction to.  States Benadryl has helped her which she did receive here.  I also recommended haloperidol however patient declined this medication.  I ordered a CT of the abdomen pelvis with IV contrast in order to evaluate the abdomen given the slight rise in  liver enzymes that we have noted here today I discussed with the patient as well as  at bedside my thought process, including other causes abdominal discomfort such as acute vascular thrombosis which patient would be at risk for given that she is on OCPs.  Also could detect other etiologies including but not limited to pancreatitis, bowel obstruction.  Spoke at length with the patient about the unclear etiology of her symptoms so imaging would help to rule out some of these other etiologies.  She has declined, and instead elects to sign out AMA will follow-up with her GI doctor at [facility] she has declined any other additional medications for use at home, understands to return anytime if symptoms worsen or change or any other new concerns.    The patient is leaving against medical advice and is clinically sober, appears to have intact judgement and insight, is free from distracting injury and in my opinion has the capacity to make decisions.   The Patient presented with nausea, abdominal discomfort  I am concerned that this may represent biliary obstruction, intra-abdominal thrombosis, bowel obstruction, pancreatitis, other intra-abdominal abnormality  Patient has expressed verbal understanding of my concerns.   I have explained the limitations of the initial evaluation here and why further evaluation is necessary, including CT scan of the abdomen pelvis with IV contrast  I have explained to the patient that without further evaluation patient may be at risk for worsening of her symptoms, delayed identification of possible life-threatening etiologies  Patient has refused and is leaving against my medical advice and understands and accepts the risks of leaving against medical advice as I have explained them.  Patient also understands my encouragement to return to this or any other Emergency Department if they would like to be evaluated further or if their condition changes or worsens, or if any new symptoms  or complaints develop.        Medical Decision Making      Disposition and Plan     Clinical Impression:  1. Nausea    2. Elevated liver enzymes    3. Nonintractable headache, unspecified chronicity pattern, unspecified headache type         Disposition:  [place]  12/23/2024  2:51 am    Follow-up:  Manny Marino MD  [address] [place] [id number]  [phone]    Follow up  Follow-up with your PMD for reevaluation in 24 to 48 hours.  Return to ER if symptoms worsen or change or if any other new concerns.    Follow-up with your GI doctor at [facility].          Medications Prescribed:  Discharge Medication List as of 12/23/2024  2:56 AM              Supplementary Documentation:

## 2024-12-23 NOTE — ED QUICK NOTES
[place] ([place]) calls to inform staff that pt is refusing IV contrast. Louie PRYOR notifies Dr Douglas.

## 2024-12-23 NOTE — ED INITIAL ASSESSMENT (HPI)
Vomiting for the past 17 days. Recent admission to [facility] 12/10. States no diagnosis found. Sent home with Zofran which family states is not helping anymore

## 2025-03-04 NOTE — PROGRESS NOTES
Here for new gynecology visit.  44 year old G 2 P 2.  Patient's last menstrual period was 2024 (approximate)..     Here to discuss her birth control pill.    She was recently diagnosed with Cushing's Syndrome by a physician in [place] at [facility]. He started her on Ketoconazole. She Cushing's is effected by estrogen, and the the Ketoconazole increases the serum estrogen levels.     She was started on continuous birth control pills due to heavy menses and PMDD. She has been well controlled using Yun but the physician recommended she change to a different lower dose option.    Menses are mostly absent as she attempts to skip with her pill.    Last pap smear was 2023 and it was normal.  No hx of abnormal pap smears.     OB Hx:  2 .    Family gyn hx:  neg.   Family breast hx:  neg.  Last mammogram in 2024.  Screening labs with PCP.    Past Medical History:    Acid reflux disease    ADHD    Allergic rhinitis    Anxiety    Attention deficit hyperactivity disorder (ADHD)    Bartholin cyst    Constipation    Esophageal reflux    Hx of motion sickness    Migraine    Migraines     Past Surgical History:   Procedure Laterality Date    Abdominoplasty      Colonoscopy  2021    Hemorrhoidectomy  2024          Other surgical history      Rhinoplasty    Reconstr nose+altaf septal repair      Upper gi endoscopy,diagnosis      essentially normal per pt, reflux     Medications Ordered Prior to Encounter[1]    OB History    Para Term  AB Living   2 2 2     2   SAB IAB Ectopic Multiple Live Births           2      # Outcome Date GA Lbr Delgado/2nd Weight Sex Type Anes PTL Lv   2 Term  40w0d  7 lb (3.175 kg) F NORMAL SPONT   ALAINA   1 Term  40w0d  6 lb 7 oz (2.92 kg) M NORMAL SPONT   ALAINA     ROS:    General:  No wt loss, wt gain, appetite changes.    /70   Pulse 83   Ht 65\"   Wt 139 lb 4 oz (63.2 kg)   LMP 2024 (Approximate)   BMI 23.17 kg/m²     Exam deferred    IMP/PLAN:    1. Birth  control counseling  Discussed Ketoconazole appears to mostly be affected by oral contraceptive use  WE discussed Nuvaring and patch form as she is not interested in progesterone only  She is amenable to trying the ring as it is the lowest estrogen dose option    - Etonogestrel-Ethinyl Estradiol (NUVARING) 0.12-0.015 MG/24HR Vaginal Ring; Place 1 ring vaginally and is will remain for 3 weeks, then remove and replace immediately with a new ring.  Dispense: 4 Ring; Refill: 1    2. PMDD (premenstrual dysphoric disorder)  Advised on risk and danger signs for VTE  - Etonogestrel-Ethinyl Estradiol (NUVARING) 0.12-0.015 MG/24HR Vaginal Ring; Place 1 ring vaginally and is will remain for 3 weeks, then remove and replace immediately with a new ring.  Dispense: 4 Ring; Refill: 1    See in 6/2025 for annual/ prn.         [1]   Current Outpatient Medications on File Prior to Visit   Medication Sig Dispense Refill    diazePAM 5 MG/5ML Oral Solution TAKE 1ML BY MOUTH DAILY AND DECREASE AS DIRECTED      diazePAM 5 MG Oral Tab TAKE 5 TABLETS BY MOUTH EVERY DAY AS PRESCRIBED      diazePAM 2 MG Oral Tab       TRULANCE 3 MG Oral Tab Take 1 tablet by mouth daily.      onabotulinumtoxinA (BOTOX) 200 units Injection Recon Soln Inject  155 units into face and neck every 12 weeks **Must be administered by a healthcare professional**      ketoconazole 200 MG Oral Tab TAKE 1 TABLET BY MOUTH EVERY 8PM AND 10PM      hydrocortisone 5 MG Oral Tab every 28 days. FOR 21 DAYS IN, THEN REMOVE FOR 7 DAYS      diclofenac 75 MG Oral Tab EC Take 1 tablet (75 mg total) by mouth 2 (two) times daily.      melatonin 3 MG Oral Tab Take 1 tablet (3 mg total) by mouth nightly.      SUMAtriptan Succinate 100 MG Oral Tab TAKE 1 TABLET BY MOUTH AT ONSET OF MIGRAINE. CAN REPEAT IN 2 HOURS IF NEEDED. MAX 3 TIMES A WEEK      hydrOXYzine 25 MG Oral Tab Take 1 tablet (25 mg total) by mouth 3 (three) times daily as needed for Itching.      diazePAM 10 MG Oral Tab Take 6  mg by mouth every evening.      ubrogepant (UBRELVY) 100 MG Oral Tab TAKE 1 TABLET BY MOUTH AT THE ONSET OF THE MIGRAINE AND CAN REPEAT IN 2 HOURS IF NEEDED 30 tablet 0     No current facility-administered medications on file prior to visit.

## 2025-04-02 ENCOUNTER — HOSPITAL ENCOUNTER (OUTPATIENT)
Dept: MRI IMAGING | Facility: HOSPITAL | Age: 45
Discharge: HOME | End: 2025-04-02
Attending: INTERNAL MEDICINE
Payer: COMMERCIAL

## 2025-04-02 DIAGNOSIS — E24.9 CUSHING'S SYNDROME (HCC): ICD-10-CM

## 2025-04-02 PROCEDURE — A9575 INJ GADOTERATE MEGLUMI 0.1ML: HCPCS | Performed by: RADIOLOGY

## 2025-04-02 PROCEDURE — 70553 MRI BRAIN STEM W/O & W/DYE: CPT | Performed by: INTERNAL MEDICINE

## 2025-04-02 RX ORDER — GADOTERATE MEGLUMINE 376.9 MG/ML
15 INJECTION INTRAVENOUS
Status: COMPLETED | OUTPATIENT
Start: 2025-04-02 | End: 2025-04-02

## 2025-04-02 RX ADMIN — GADOTERATE MEGLUMINE 15 ML: 376.9 INJECTION INTRAVENOUS at 16:42:00

## 2025-04-30 ENCOUNTER — PATIENT MESSAGE (OUTPATIENT)
Dept: OBGYN CLINIC | Facility: CLINIC | Age: 45
End: 2025-04-30

## 2025-05-05 NOTE — TELEPHONE ENCOUNTER
It appears the endocrinologist feels the birth control can raise the cortisol levels not just the Ketoconazole levels from the notes I see from [facility]. Are they sending her elsewhere now for further evaluation? I would prefer they give us guidance to start her back on combination birth control before we resume it. Also if she is having pelvic pain I would recommend she come in for evaluation with an MD.

## 2025-06-18 ENCOUNTER — OFFICE VISIT (OUTPATIENT)
Dept: OBGYN CLINIC | Facility: CLINIC | Age: 45
End: 2025-06-18
Payer: COMMERCIAL

## 2025-06-18 VITALS
DIASTOLIC BLOOD PRESSURE: 62 MMHG | WEIGHT: 141 LBS | BODY MASS INDEX: 23.49 KG/M2 | HEIGHT: 65 IN | HEART RATE: 82 BPM | SYSTOLIC BLOOD PRESSURE: 100 MMHG

## 2025-06-18 DIAGNOSIS — F32.81 PMDD (PREMENSTRUAL DYSPHORIC DISORDER): ICD-10-CM

## 2025-06-18 DIAGNOSIS — Z12.31 ENCOUNTER FOR SCREENING MAMMOGRAM FOR BREAST CANCER: ICD-10-CM

## 2025-06-18 DIAGNOSIS — Z01.419 WELL WOMAN EXAM WITH ROUTINE GYNECOLOGICAL EXAM: Primary | ICD-10-CM

## 2025-06-18 PROCEDURE — 3008F BODY MASS INDEX DOCD: CPT | Performed by: NURSE PRACTITIONER

## 2025-06-18 PROCEDURE — 3074F SYST BP LT 130 MM HG: CPT | Performed by: NURSE PRACTITIONER

## 2025-06-18 PROCEDURE — 3078F DIAST BP <80 MM HG: CPT | Performed by: NURSE PRACTITIONER

## 2025-06-18 PROCEDURE — 99396 PREV VISIT EST AGE 40-64: CPT | Performed by: NURSE PRACTITIONER

## 2025-06-18 RX ORDER — POLYETHYLENE GLYCOL 3350, SODIUM CHLORIDE, SODIUM BICARBONATE, POTASSIUM CHLORIDE 420; 11.2; 5.72; 1.48 G/4L; G/4L; G/4L; G/4L
4000 POWDER, FOR SOLUTION ORAL
COMMUNITY
Start: 2025-04-24

## 2025-06-18 RX ORDER — DROSPIRENONE AND ETHINYL ESTRADIOL 0.03MG-3MG
KIT ORAL
COMMUNITY
Start: 2025-03-28 | End: 2025-06-18 | Stop reason: ALTCHOICE

## 2025-06-18 RX ORDER — DROSPIRENONE 4 MG/1
1 TABLET, FILM COATED ORAL DAILY
Qty: 112 TABLET | Refills: 4 | Status: SHIPPED | OUTPATIENT
Start: 2025-06-18 | End: 2026-06-18

## 2025-06-18 RX ORDER — FREMANEZUMAB-VFRM 225 MG/1.5ML
INJECTION SUBCUTANEOUS
COMMUNITY
Start: 2025-06-02

## 2025-06-18 RX ORDER — OSILODROSTAT 1 MG/1
TABLET, COATED ORAL
COMMUNITY
Start: 2025-05-21

## 2025-06-18 NOTE — PROGRESS NOTES
Here for Routine Annual Exam  No concerns or questions.  Menses are absent, she  skips then with the pill.  Contraception- OCP.  She is dong well with the Slynd, Her PMDD is well managed and she hasn't had any breakthrough bleeding.    ROS: No Cardiac, Respiratory, GI,  or Neurological symptoms.    PE:  GENERAL: well developed, well nourished, in no apparent distress  SKIN: no rashes, no suspicious lesions  HEENT: normal  NECK: supple; no thyroidmegaly, no adenopathy  LUNGS: clear to auscultation  CARDIOVASCULAR: normal S1, S2, RRR  BREASTS: firm, nontendder, no palpable masses or nodes, no nipple discharge, no skin changes, no axillary adenopathy,    ABDOMEN: Soft, non distended; non tender, no masses  GYNE/: External Genitalia: Normal without lesions or erythema                      Vagina: normal without lesions or discharge                      Uterus: mid, mobile, non tender, normal size                     Cervix: no lesions or CMT                     Adnexa: non tender, no masses, normal size  EXTREMITIES:  non tender without edema    A/P:   1. Well woman exam with routine gynecological exam  Pap deferred  Increase calcium intake and weight bearing exercises    2. Encounter for screening mammogram for breast cancer  Regular self breast exams recommended  - [facility] TREY 2D+3D SCREENING BILAT (CPT=77067/99844); Future    3. PMDD (premenstrual dysphoric disorder)  - Drospirenone (SLYND) 4 MG Oral Tab; Take 1 tablet by mouth daily. TO skip placebo pills  Dispense: 112 tablet; Refill: 4       Return to clinic 1 year for routine exam, or as needed with any concerns or question

## 2025-08-01 ENCOUNTER — OFFICE VISIT (OUTPATIENT)
Dept: FAMILY MEDICINE CLINIC | Facility: CLINIC | Age: 45
End: 2025-08-01

## 2025-08-01 VITALS
WEIGHT: 140 LBS | RESPIRATION RATE: 16 BRPM | DIASTOLIC BLOOD PRESSURE: 60 MMHG | OXYGEN SATURATION: 99 % | HEART RATE: 92 BPM | HEIGHT: 65 IN | TEMPERATURE: 99 F | SYSTOLIC BLOOD PRESSURE: 90 MMHG | BODY MASS INDEX: 23.32 KG/M2

## 2025-08-01 DIAGNOSIS — Z02.9 ADMINISTRATIVE ENCOUNTER: Primary | ICD-10-CM

## 2025-08-01 RX ORDER — HYDROXYZINE HYDROCHLORIDE 25 MG/1
25 TABLET, FILM COATED ORAL 2 TIMES DAILY PRN
COMMUNITY
Start: 2025-05-14

## 2025-08-01 RX ORDER — POLYETHYLENE GLYCOL 3350 17 G/17G
17 POWDER, FOR SOLUTION ORAL DAILY
COMMUNITY

## 2025-08-01 RX ORDER — OSILODROSTAT 1 MG/1
TABLET, COATED ORAL
COMMUNITY
Start: 2025-05-21

## 2025-08-01 RX ORDER — DROSPIRENONE 4 MG/1
1 TABLET, FILM COATED ORAL DAILY
COMMUNITY
Start: 2025-07-15

## 2025-08-01 RX ORDER — HYDROCORTISONE 5 MG/1
TABLET ORAL
COMMUNITY
Start: 2025-07-29

## 2025-08-01 RX ORDER — FREMANEZUMAB-VFRM 225 MG/1.5ML
INJECTION SUBCUTANEOUS
COMMUNITY
Start: 2025-06-30

## (undated) DEVICE — SOLUTION PREP 4OZ 10% POVIDONE IOD SCR TOP

## (undated) DEVICE — #11 STERILE BLADE: Brand: POLYMER COATED BLADES

## (undated) DEVICE — GAUZE,SPONGE,4"X4",12PLY,STERILE,LF,2'S: Brand: MEDLINE

## (undated) DEVICE — SYRINGE MED 10ML LL TIP W/O SFTY DISP

## (undated) DEVICE — UNDERPANTS MAT 2XL FOR 24-64IN WAIST PUR

## (undated) DEVICE — SLEEVE COMPR MD KNEE LEN SGL USE KENDALL SCD

## (undated) DEVICE — ANTIBACTERIAL UNDYED BRAIDED (POLYGLACTIN 910), SYNTHETIC ABSORBABLE SUTURE: Brand: COATED VICRYL

## (undated) DEVICE — SOLUTION IRRIG 1000ML 0.9% NACL USP BTL

## (undated) DEVICE — GLOVE SUR 8 SENSICARE PI PIP CRM PWD F

## (undated) DEVICE — PACK CDS RECTAL

## (undated) NOTE — LETTER
24    Patient: Fortunato Johnson  : 1980 Visit date: 2024    Dear  Tiana Gibbs MD    Thank you for referring Fortunato Johnson to my practice.  Please find my assessment and plan below.     Assessment   1. Anal fissure    2. Internal hemorrhoids with complication          Plan     The patient has 2 concurrent problems.  She has an anterior anal fissure with sentinel pile and internal hemorrhoids with bleeding.    I recommend addressing these fissure first.  I encouraged the patient to resume taking nifedipine and to take it as prescribed for 2 weeks continuously.    The patient will follow-up with me in 2 weeks to assess response to treatment.  If there has been no interval improvement in her anal fissure we will consider subcutaneous lateral internal sphincterotomy.  Additionally, the patient wishes to consider Botox injection.  If she considers Botox injection I will have her see Dr. Breanne honeycutt for second opinion.    If patient fails to improve with nonoperative management, surgical intervention for her sphincterotomy    If the patient's fissure heals then we will proceed with rubber band ligation phenol injection in the office.    The patient was provided ample opportunity to ask questions.  All of the patient's questions were answered in detail.  The patient voiced understanding of the care plan.      Sincerely,       Shan Dodge MD   CC:   No Recipients

## (undated) NOTE — LETTER
[address]  [address]  Authorization for Surgical Operation and Procedure     Date:___________                                                                                                         Time:__________  I hereby authorize Surgeon(s):  Shan Dodge MD, my physician and his/her assistants (if applicable), which may include medical students, residents, and/or fellows, to perform the following surgical operation/ procedure and administer such anesthesia as may be determined necessary by my physician:  Operation/Procedure name (s) Procedure(s):  ANAL EXAMINATION UNDER ANESTHESIA, HEMORRHOID BANDING TIMES THREE, AND LATERAL INTERNAL SPHINCTEROTOMY, EXCISION OF PERIANAL SKIN TAG on Fortunato Johnson   2.   I recognize that during the surgical operation/procedure, unforeseen conditions may necessitate additional or different procedures than those listed above.  I, therefore, further authorize and request that the above-named surgeon, assistants, or designees perform such procedures as are, in their judgment, necessary and desirable.    3.   My surgeon/physician has discussed prior to my surgery the potential benefits, risks and side effects of this procedure; the likelihood of achieving goals; and potential problems that might occur during recuperation.  They also discussed reasonable alternatives to the procedure, including risks, benefits, and side effects related to the alternatives and risks related to not receiving this procedure.  I have had all my questions answered and I acknowledge that no guarantee has been made as to the result that may be obtained.    4.   Should the need arise during my operation/procedure, which includes change of level of care prior to discharge, I also consent to the administration of blood and/or blood products.  Further, I understand that despite careful testing and screening of blood or blood products by collecting  agencies, I may still be subject to ill effects as a result of receiving a blood transfusion and/or blood products.  The following are some, but not all, of the potential risks that can occur: fever and allergic reactions, hemolytic reactions, transmission of diseases such as Hepatitis, AIDS and Cytomegalovirus (CMV) and fluid overload.  In the event that I wish to have an autologous transfusion of my own blood, or a directed donor transfusion, I will discuss this with my physician.  Check only if Refusing Blood or Blood Products  I understand refusal of blood or blood products as deemed necessary by my physician may have serious consequences to my condition to include possible death. I hereby assume responsibility for my refusal and release the hospital, its personnel, and my physicians from any responsibility for the consequences of my refusal.          o  Refuse      5.   I authorize the use of any specimen, organs, tissues, body parts or foreign objects that may be removed from my body during the operation/procedure for diagnosis, research or teaching purposes and their subsequent disposal by hospital authorities.  I also authorize the release of specimen test results and/or written reports to my treating physician on the hospital medical staff or other referring or consulting physicians involved in my care, at the discretion of the Pathologist or my treating physician.    6.   I consent to the photographing or videotaping of the operations or procedures to be performed, including appropriate portions of my body for medical, scientific, or educational purposes, provided my identity is not revealed by the pictures or by descriptive texts accompanying them.  If the procedure has been photographed/videotaped, the surgeon will obtain the original picture, image, videotape or CD.  The hospital will not be responsible for storage, release or maintenance of the picture, image, tape or CD.    7.   I consent to the  presence of a  or observers in the operating room as deemed necessary by my physician or their designees.    8.   I recognize that in the event my procedure results in extended X-Ray/fluoroscopy time, I may develop a skin reaction.    9. If I have a Do Not Attempt Resuscitation (DNAR) order in place, that status will be suspended while in the operating room, procedural suite, and during the recovery period unless otherwise explicitly stated by me (or a person authorized to consent on my behalf). The surgeon or my attending physician will determine when the applicable recovery period ends for purposes of reinstating the DNAR order.  10. Patients having a sterilization procedure: I understand that if the procedure is successful the results will be permanent and it will therefore be impossible for me to inseminate, conceive, or bear children.  I also understand that the procedure is intended to result in sterility, although the result has not been guaranteed.   11. I acknowledge that my physician has explained sedation/analgesia administration to me including the risk and benefits I consent to the administration of sedation/analgesia as may be necessary or desirable in the judgment of my physician.    I CERTIFY THAT I HAVE READ AND FULLY UNDERSTAND THE ABOVE CONSENT TO OPERATION and/or OTHER PROCEDURE.    _________________________________________  __________________________________  Signature of Patient     Signature of Responsible Person         ___________________________________         Printed Name of Responsible Person           _________________________________                 Relationship to Patient  _________________________________________  ______________________________  Signature of Witness          Date  Time      Patient Name: Fortunato Tavarez Johnson     : 1980                 Printed: November 15, 2024     Medical Record #: [id number]                     Page 2 of 3                                     [address]  [address]    Consent for Anesthesia    IFortunato agree to be cared for by an anesthesiologist, who is specially trained to monitor me and give me medicine to put me to sleep or keep me comfortable during my procedure    I understand that my anesthesiologist is not an employee or agent of [facility] or [employer]. He or she works for [employer].    As the patient asking for anesthesia services, I agree to:  Allow the anesthesiologist (anesthesia doctor) to give me medicine and do additional procedures as necessary. Some examples are: Starting or using an “IV” to give me medicine, fluids or blood during my procedure, and having a breathing tube placed to help me breathe when I’m asleep (intubation). In the event that my heart stops working properly, I understand that my anesthesiologist will make every effort to sustain my life, unless otherwise directed by [facility] Do Not Resuscitate documents.  Tell my anesthesia doctor before my procedure:  If I am pregnant.  The last time that I ate or drank.  All of the medicines I take (including prescriptions, herbal supplements, and pills I can buy without a prescription (including street drugs/illegal medications). Failure to inform my anesthesiologist about these medicines may increase my risk of anesthetic complications.  If I am allergic to anything or have had a reaction to anesthesia before.  I understand how the anesthesia medicine will help me (benefits).  I understand that with any type of anesthesia medicine there are risks:  The most common risks are: nausea, vomiting, sore throat, muscle soreness, damage to my eyes, mouth, or teeth (from breathing tube placement).  Rare risks include: remembering what happened during my procedure, allergic reactions to medications, injury to my airway, heart, lungs, vision, nerves, or muscles and in  extremely rare instances death.  My doctor has explained to me other choices available to me for my care (alternatives).  Pregnant Patients (“epidural”):  I understand that the risks of having an epidural (medicine given into my back to help control pain during labor), include itching, low blood pressure, difficulty urinating, headache or slowing of the baby’s heart. Very rare risks include infection, bleeding, seizure, irregular heart rhythms and nerve injury.  Regional Anesthesia (“spinal”, “epidural”, & “nerve blocks”):  I understand that rare but potential complications include headache, bleeding, infection, seizure, irregular heart rhythms, and nerve injury.    I can change my mind about having anesthesia services at any time before I get the medicine.    _____________________________________________________________________________  Patient (or Representative) Signature/Relationship to Patient  Date   Time    _____________________________________________________________________________   Name (if used)    Language/Organization   Time    _____________________________________________________________________________  Anesthesiologist Signature     Date   Time  I have discussed the procedure and information above with the patient (or patient’s representative) and answered their questions. The patient or their representative has agreed to have anesthesia services.    _____________________________________________________________________________  Witness        Date   Time  I have verified that the signature is that of the patient or patient’s representative, and that it was signed before the procedure  Patient Name: Fortunato Johnson     : 1980                 Printed: November 15, 2024     Medical Record #: [id number]                     Page 3 of 3

## (undated) NOTE — LETTER
24    Patient: Fortunato Johnson  : 1980 Visit date: 2024    Dear  Tiana Gibbs MD    Thank you for referring Fortunato Johnson to my practice.  Please find my assessment and plan below.    Assessment   1. Anal fissure    2. Internal hemorrhoids        Plan     The patient will be scheduled for anorectal examination under anesthesia, lateral subcutaneous internal sphincterotomy, and rubber band ligation of internal hemorrhoids.    The gregorio-operative care plan was discussed with the patient, who voices understanding.  Activity and lifting recommendations were discussed in length.     The risks, benefits, and alternatives to the procedure were explained to the patient.  The risks explained include, but are not limited to, bleeding, infection, pain wound complications, recurrence, incorrect diagnosis, injury to adjacent organs and structures. We also discussed the possibile need for further therapeutic, diagnostic, or surgical intervention.  The patient voiced understanding, and after all questions were answered to the patient's satisfaction, the patient provided willing and informed consent to proceed.       Sincerely,       Shan Dodge MD   CC:   No Recipients